# Patient Record
Sex: MALE | Race: WHITE | Employment: OTHER | URBAN - NONMETROPOLITAN AREA
[De-identification: names, ages, dates, MRNs, and addresses within clinical notes are randomized per-mention and may not be internally consistent; named-entity substitution may affect disease eponyms.]

---

## 2019-12-20 DIAGNOSIS — D75.1 POLYCYTHEMIA: Primary | ICD-10-CM

## 2019-12-23 ENCOUNTER — HOSPITAL ENCOUNTER (OUTPATIENT)
Dept: INFUSION THERAPY | Age: 59
Discharge: HOME | End: 2019-12-23
Payer: COMMERCIAL

## 2019-12-23 ENCOUNTER — OFFICE VISIT (OUTPATIENT)
Dept: HEMATOLOGY | Age: 59
End: 2019-12-23
Payer: COMMERCIAL

## 2019-12-23 VITALS
HEART RATE: 88 BPM | DIASTOLIC BLOOD PRESSURE: 82 MMHG | OXYGEN SATURATION: 96 % | SYSTOLIC BLOOD PRESSURE: 118 MMHG | WEIGHT: 170.8 LBS | HEIGHT: 70 IN | BODY MASS INDEX: 24.45 KG/M2

## 2019-12-23 DIAGNOSIS — D75.1 ERYTHROCYTOSIS: Primary | ICD-10-CM

## 2019-12-23 DIAGNOSIS — D75.1 ERYTHROCYTOSIS: ICD-10-CM

## 2019-12-23 DIAGNOSIS — R53.82 CHRONIC FATIGUE: ICD-10-CM

## 2019-12-23 DIAGNOSIS — D75.1 POLYCYTHEMIA: ICD-10-CM

## 2019-12-23 DIAGNOSIS — M25.50 MULTIPLE JOINT PAIN: ICD-10-CM

## 2019-12-23 PROCEDURE — 85025 COMPLETE CBC W/AUTO DIFF WBC: CPT

## 2019-12-23 PROCEDURE — 99213 OFFICE O/P EST LOW 20 MIN: CPT | Performed by: NURSE PRACTITIONER

## 2019-12-23 PROCEDURE — 99211 OFF/OP EST MAY X REQ PHY/QHP: CPT

## 2019-12-23 RX ORDER — OMEPRAZOLE 40 MG/1
CAPSULE, DELAYED RELEASE ORAL
COMMUNITY
Start: 2019-11-20 | End: 2021-06-21

## 2019-12-23 NOTE — PROGRESS NOTES
Phlebotomy as ordered. 400cc removed from [address] [place] [address] without complication. 400cc oral intake.

## 2019-12-27 ASSESSMENT — ENCOUNTER SYMPTOMS
VOMITING: 0
ABDOMINAL PAIN: 0
NAUSEA: 0
TROUBLE SWALLOWING: 0
PHOTOPHOBIA: 0
EYE REDNESS: 0
WHEEZING: 0
SHORTNESS OF BREATH: 0
DIARRHEA: 0
EYE ITCHING: 0
SORE THROAT: 0
COUGH: 0
CONSTIPATION: 0
EYE DISCHARGE: 0

## 2020-06-19 NOTE — PROGRESS NOTES
the erythrocytosis of unclear etiology. Mr. Lynne Gonzales seems to feel that his phlebotomies occasionally improved his symptoms of arthritis but this is yet to be determined if there is a direct correlation between these procedures and his rheumatologic symptoms. Mr. Sharie Sicard hemoglobin at his initial consultation in this office was 11.5 with an MCV of 84.2. The other parameters of his CBC were normal.  A bone marrow biopsy and aspirate was performed on 9/18/14. This was normocellular with trilineage hematopoesis and only mild megakaryocytic hyperplasia. There was NO evidence of JAK2 V617F or Exon 12 mutation. Stainable iron was absent cytogenetics were normal it was felt that his erythrocytosis was secondary. An erythropoietin level was normal at 6.5. A sleep apnea study was done with Dr. Brayden Baker on 10/21/2014 revealing mild obstructive sleep apnea and insomnia. Recommendations were to avoid alcohol prior to sleep (Mr. Lynne Gonzales does drink beer regularly, but states he is not concerned about this because it is [employer] light). Continued periodic phlebotomies will be done. CALR and MPL W515 mutations were negative. Past Medical History:   Diagnosis Date    Colon polyposis     COPD (chronic obstructive pulmonary disease) ([address]     Mild    Diverticulosis     Erythrocytosis     Former smoker     Quit 2004    GERD (gastroesophageal reflux disease)     With Schatzki's ring    Hyperlipidemia      Past Surgical History:   Procedure Laterality Date    COLONOSCOPY  04/19/2017    In [place], [place] at [facility]  03/12/2013    In [place]     Current Outpatient Medications   Medication Sig Dispense Refill    aspirin 81 MG EC tablet Take 81 mg by mouth daily      omeprazole (PRILOSEC) 40 MG delayed release capsule TAKE ONE CAPSULE BY MOUTH EVERY MORNING       No current facility-administered medications for this visit.        Allergies   Allergen Reactions    Penicillins      Social History     Tobacco Use    Smoking status: Former Smoker     Types: Cigarettes     Last attempt to quit: 2004     Years since quittin.2    Smokeless tobacco: Never Used   Substance Use Topics    Alcohol use: Not on file    Drug use: Not on file     Family History: non-contributory    Review of Systems   Constitutional: Positive for fatigue. Negative for fever. No night sweats   HENT: Negative for dental problem, hearing loss, mouth sores, nosebleeds, sore throat and trouble swallowing. Eyes: Negative for photophobia, discharge, redness and itching. No blurring of vision, no double vision   Respiratory: Negative for cough, shortness of breath and wheezing. No hemoptysis   Cardiovascular: Negative for chest pain, palpitations and leg swelling. Gastrointestinal: Negative for abdominal pain, constipation, diarrhea, nausea and vomiting. Endocrine: Negative for cold intolerance, heat intolerance, polydipsia and polyuria. Genitourinary: Negative for dysuria, frequency, hematuria and urgency. Musculoskeletal: Positive for arthralgias and neck pain. Negative for joint swelling and myalgias. Skin: Negative for pallor and rash. Allergic/Immunologic: Negative for environmental allergies and immunocompromised state. Neurological: Negative for seizures, syncope and numbness. Hematological: Negative for adenopathy. Does not bruise/bleed easily. Psychiatric/Behavioral: Negative for agitation, behavioral problems, confusion and suicidal ideas. The patient is not nervous/anxious. Physical Exam  Vitals signs reviewed. Constitutional:       General: He is not in acute distress. Appearance: He is well-developed. He is not toxic-appearing or diaphoretic. HENT:      Head: Normocephalic and atraumatic.       Right Ear: External ear normal.      Left Ear: External ear normal.      Nose: Nose normal.      Mouth/Throat:      Mouth: Mucous membranes are

## 2020-06-23 ENCOUNTER — HOSPITAL ENCOUNTER (OUTPATIENT)
Dept: INFUSION THERAPY | Age: 60
Discharge: HOME | End: 2020-06-23
Payer: COMMERCIAL

## 2020-06-23 ENCOUNTER — OFFICE VISIT (OUTPATIENT)
Dept: HEMATOLOGY | Age: 60
End: 2020-06-23
Payer: COMMERCIAL

## 2020-06-23 VITALS
OXYGEN SATURATION: 95 % | BODY MASS INDEX: 24.18 KG/M2 | SYSTOLIC BLOOD PRESSURE: 120 MMHG | WEIGHT: 168.9 LBS | HEIGHT: 70 IN | TEMPERATURE: 98.4 F | DIASTOLIC BLOOD PRESSURE: 70 MMHG | HEART RATE: 72 BPM

## 2020-06-23 DIAGNOSIS — D75.1 ERYTHROCYTOSIS: ICD-10-CM

## 2020-06-23 PROBLEM — M54.2 NECK PAIN: Status: ACTIVE | Noted: 2020-06-23

## 2020-06-23 LAB
BASOPHILS ABSOLUTE: 0.05 K/UL (ref 0.01–0.08)
BASOPHILS RELATIVE PERCENT: 0.6 % (ref 0.1–1.2)
EOSINOPHILS ABSOLUTE: 0.31 K/UL (ref 0.04–0.54)
EOSINOPHILS RELATIVE PERCENT: 3.7 % (ref 0.7–7)
HCT VFR BLD CALC: 49.3 % (ref 40.1–51)
HEMOGLOBIN: 16 G/DL (ref 13.7–17.5)
LYMPHOCYTES ABSOLUTE: 1.68 K/UL (ref 1.18–3.74)
LYMPHOCYTES RELATIVE PERCENT: 20.1 % (ref 19.3–53.1)
MCH RBC QN AUTO: 30.7 PG (ref 25.7–32.2)
MCHC RBC AUTO-ENTMCNC: 32.5 G/DL (ref 32.3–36.5)
MCV RBC AUTO: 94.4 FL (ref 79–92.2)
MONOCYTES ABSOLUTE: 0.84 K/UL (ref 0.24–0.82)
MONOCYTES RELATIVE PERCENT: 10.1 % (ref 4.7–12.5)
NEUTROPHILS ABSOLUTE: 5.47 K/UL (ref 1.56–6.13)
NEUTROPHILS RELATIVE PERCENT: 65.5 % (ref 34–71.1)
PDW BLD-RTO: 12.8 % (ref 11.6–14.4)
PLATELET # BLD: 211 K/UL (ref 163–337)
PMV BLD AUTO: 9.9 FL (ref 7.4–10.4)
RBC # BLD: 5.22 M/UL (ref 4.63–6.08)
WBC # BLD: 8.35 K/UL (ref 4.23–9.07)

## 2020-06-23 PROCEDURE — 99213 OFFICE O/P EST LOW 20 MIN: CPT | Performed by: NURSE PRACTITIONER

## 2020-06-23 PROCEDURE — 85025 COMPLETE CBC W/AUTO DIFF WBC: CPT

## 2020-06-23 PROCEDURE — 99195 PHLEBOTOMY: CPT

## 2020-06-23 RX ORDER — ASPIRIN 81 MG/1
81 TABLET ORAL DAILY
COMMUNITY

## 2020-06-23 ASSESSMENT — ENCOUNTER SYMPTOMS
CONSTIPATION: 0
NAUSEA: 0
WHEEZING: 0
EYE DISCHARGE: 0
EYE REDNESS: 0
PHOTOPHOBIA: 0
TROUBLE SWALLOWING: 0
SHORTNESS OF BREATH: 0
DIARRHEA: 0
VOMITING: 0
SORE THROAT: 0
EYE ITCHING: 0
ABDOMINAL PAIN: 0
COUGH: 0

## 2020-06-23 NOTE — PROGRESS NOTES
THERAPEUTIC PHLEBOTOMY  Most recent Hemoglobin 16.0 Gm/dl and Hematocrit 49.3%   Pre-phlebotomy Vital Signs: Refer to Doc flow sheet  Start time: 0950  Tourniquet placed on patient's arm and arm palpated for venous access. Area of venous access cleansed with alcohol. Sheath removed from the needle and needle inserted into the left antecubital site. Blood flowing well down the tubing into collection bag. Adjustment/no adjustment of needle needed to maintain adequate blood flow. Scale monitoring amount of blood in bag. Stop Time: 1010  Needle removed from patient's arm. Pressure applied to patient's arm until bleeding stopped. Dry sterile dressing applied over puncture site and secured with Coban/self-adherent wrap. Final amount of blood removed: 500 cc  Post Vital Signs: Refer to Doc flow sheet  Patient tolerated procedure well. No redness, edema, or signs of active bleeding noted. Discharge Instructions provided: No heavy pushing or lifting for the next 24 hours. Keep dressing dry and in place for 4 to 6 hours. If a fever GREATER than 100.5 develops, contact office. Patient discharged ambulatory with belongings.

## 2020-12-10 ENCOUNTER — HOSPITAL ENCOUNTER (OUTPATIENT)
Dept: INFUSION THERAPY | Age: 60
Discharge: HOME | End: 2020-12-10
Payer: COMMERCIAL

## 2020-12-10 ENCOUNTER — OFFICE VISIT (OUTPATIENT)
Dept: HEMATOLOGY | Age: 60
End: 2020-12-10
Payer: COMMERCIAL

## 2020-12-10 VITALS
TEMPERATURE: 97.8 F | DIASTOLIC BLOOD PRESSURE: 82 MMHG | HEART RATE: 73 BPM | WEIGHT: 170.1 LBS | BODY MASS INDEX: 24.35 KG/M2 | SYSTOLIC BLOOD PRESSURE: 128 MMHG | HEIGHT: 70 IN | OXYGEN SATURATION: 97 %

## 2020-12-10 DIAGNOSIS — D75.1 ERYTHROCYTOSIS: Primary | ICD-10-CM

## 2020-12-10 LAB
BASOPHILS ABSOLUTE: 0.04 K/UL (ref 0.01–0.08)
BASOPHILS RELATIVE PERCENT: 0.3 % (ref 0.1–1.2)
EOSINOPHILS ABSOLUTE: 0.06 K/UL (ref 0.04–0.54)
EOSINOPHILS RELATIVE PERCENT: 0.4 % (ref 0.7–7)
HCT VFR BLD CALC: 47 % (ref 40.1–51)
HEMOGLOBIN: 15.5 G/DL (ref 13.7–17.5)
LYMPHOCYTES ABSOLUTE: 1.75 K/UL (ref 1.18–3.74)
LYMPHOCYTES RELATIVE PERCENT: 13.1 % (ref 19.3–53.1)
MCH RBC QN AUTO: 30.9 PG (ref 25.7–32.2)
MCHC RBC AUTO-ENTMCNC: 33 G/DL (ref 32.3–36.5)
MCV RBC AUTO: 93.8 FL (ref 79–92.2)
MONOCYTES ABSOLUTE: 0.97 K/UL (ref 0.24–0.82)
MONOCYTES RELATIVE PERCENT: 7.3 % (ref 4.7–12.5)
NEUTROPHILS ABSOLUTE: 10.52 K/UL (ref 1.56–6.13)
NEUTROPHILS RELATIVE PERCENT: 78.9 % (ref 34–71.1)
PDW BLD-RTO: 12.8 % (ref 11.6–14.4)
PLATELET # BLD: 249 K/UL (ref 163–337)
PMV BLD AUTO: 9.9 FL (ref 7.4–10.4)
RBC # BLD: 5.01 M/UL (ref 4.63–6.08)
WBC # BLD: 13.34 K/UL (ref 4.23–9.07)

## 2020-12-10 PROCEDURE — 99195 PHLEBOTOMY: CPT

## 2020-12-10 PROCEDURE — 99213 OFFICE O/P EST LOW 20 MIN: CPT | Performed by: NURSE PRACTITIONER

## 2020-12-10 PROCEDURE — 85025 COMPLETE CBC W/AUTO DIFF WBC: CPT

## 2020-12-10 RX ORDER — 0.9 % SODIUM CHLORIDE 0.9 %
250 INTRAVENOUS SOLUTION INTRAVENOUS ONCE
Status: CANCELLED | OUTPATIENT
Start: 2020-12-10

## 2020-12-10 RX ORDER — 0.9 % SODIUM CHLORIDE 0.9 %
500 INTRAVENOUS SOLUTION INTRAVENOUS ONCE
Status: CANCELLED | OUTPATIENT
Start: 2020-12-10

## 2020-12-10 ASSESSMENT — ENCOUNTER SYMPTOMS
VOMITING: 0
EYE ITCHING: 0
TROUBLE SWALLOWING: 0
NAUSEA: 0
EYE DISCHARGE: 0
SHORTNESS OF BREATH: 0
COUGH: 0
SORE THROAT: 0
CONSTIPATION: 0
WHEEZING: 0
DIARRHEA: 0
ABDOMINAL PAIN: 0

## 2020-12-10 NOTE — PROGRESS NOTES
Progress Note      Pt Name: Vincenzo Zambraon  YOB: 1960  MRN: [id number]    Date of evaluation: 12/10/2020  History Obtained From:  Patient, EMR    HISTORY OF PRESENT ILLNESS:    Sydni Shearer is a 64year-old  gentleman who receives phlebotomies every 6 months for symptom management regarding a diagnosis of secondary erythrocytosis. Symptoms include fatigue, headaches, joint pain that are relieved with phlebotomy. GERD is stable. He has noticed mild change in vision over the past 2-3 weeks. CMP was unremarkable and ferritin was 34 on 12/23/2019. H/H is 15.5/47.0 and blood pressure 128/82. Damaris Johnson returns to the clinic, scheduled for phlebotomy today. He denies changes in medications or health history since last evaluated 6 months ago. Health Maintenance:  Colonoscopy 4/19/2017 revealed that he did have a transverse colon polyp removed, he has hemorrhoids and diverticulosis. PSA per PCP     HEMATOLOGIC HISTORY: Secondary erythrocytosis  Mr. Gavi Quiroz was seen in initial hematologic evaluation in this office on 09/03/13, referred by Dr. Gissel Gonsalves for polycythemia. Mr. Gavi Quiroz was first diagnosed with polycythemia in November 2011 when he presented with a CBC on 10/15/13 with a hemoglobin of 18.9 and hematocrit of 56.8. Platelets and WBC were normal. His workup was performed by Dr. Christiane Leos with a negative JAK2 mutation analysis and also negative for BCR/abl. Mr. Gavi Quiroz does have a 25 pack/year history of smoking however he quit in 2004. Management with periodic phlebotomies has been performed per Dr. Gopi Martinez in conjunction with Dr. Manohar Edge for the erythrocytosis of unclear etiology. Mr. Gavi Quiroz seems to feel that his phlebotomies occasionally improved his symptoms of arthritis but this is yet to be determined if there is a direct correlation between these procedures and his rheumatologic symptoms.   Mr. Sánchez Mantle hemoglobin at his initial consultation in this office Normal breath sounds. No wheezing or rales. Abdominal:      General: Bowel sounds are normal. There is no distension. Palpations: Abdomen is soft. Tenderness: There is no abdominal tenderness. There is no guarding. Genitourinary:     Comments: Exam deferred  Musculoskeletal:         General: No tenderness or deformity. Comments: Normal ROM all four extremities   Lymphadenopathy:      Cervical: No cervical adenopathy. Right cervical: No superficial cervical adenopathy. Left cervical: No superficial cervical adenopathy. Upper Body:      Right upper body: No supraclavicular adenopathy. Left upper body: No supraclavicular adenopathy. Comments: No bulky palpable cervical, clavicular, axillary or inguinal adenopathies on the left or right. Skin:     General: Skin is warm and dry. Findings: No rash. Neurological:      Mental Status: He is alert and oriented to person, place, and time. Comments: follows commands, non-focal   Psychiatric:         Behavior: Behavior normal. Behavior is cooperative. Thought Content: Thought content normal.         Judgment: Judgment normal.      Comments: Alert and oriented to person, place and time. Vitals:    12/10/20 1034   BP: 128/82   Pulse: 73   Temp: 97.8 °F (36.6 °C)   TempSrc: Temporal   SpO2: 97%   Weight: 170 lb 1.6 oz (77.2 kg)   Height: 5' 10\" (1.778 m)      Wt Readings from Last 3 Encounters:   12/10/20 170 lb 1.6 oz (77.2 kg)   06/23/20 168 lb 14.4 oz (76.6 kg)   12/23/19 170 lb 12.8 oz (77.5 kg)     LABS:  CBC 12/10/2020: WBC 13.34, Hgb 15.5/HCT 47, platelets 372,973    ASSESSMENT/PLAN:    No diagnosis found. Secondary erythrocytosis  H/H 15.5/47  Therapeutic phlebotomy will be performed today and continue every 6 months as needed for symptomatic management    Chronic fatigue, multiple joint pain  Symptoms improved with phlebotomy     GERD  Stable, on Omeprazole.      Body mass index is 24.41 kg/m². BMI is within normal limits. Federal guidelines recommend that people under the age of 72 should have a BMI of 18.5-25 and people age 72 and older should have a BMI of 23-30. If yours is outside the range, we recommend you utilize a diet and exercise program to get yours into the range. We also recommend you speak with your primary care doctor should any specific advice be needed regarding special diets or programs which would be appropriate for your circumstances. Return in about 6 months (around 6/10/2021) for follow up with JAMES Hurst with phlebotomy. I have seen, examined and reviewed patient medication list, appropriate labs and imaging studies. Relevant medical records and other physician notes have been reviewed. I answered all questions to the best of my knowledge and to the patient's satisfaction. Dictated utilizing Dragon transcription software.          JAMES Cid  8:04 PM  12/10/2020

## 2020-12-10 NOTE — PROGRESS NOTES
THERAPEUTIC PHLEBOTOMY  Most recent Hemoglobin 15.5 Gm/dl and Hematocrit 47%   Date obtained: 12 /10 /2020    Pre-phlebotomy Vital Signs: Refer to Doc flow sheet  Start time: 1115  Tourniquet placed on patient's arm and arm palpated for venous access. Area of venous access cleansed with alcohol. Sheath removed from the needle and needle inserted into the left antecubital site. Blood flowing well down the tubing into collection bag. Adjustment/no adjustment of needle needed to maintain adequate blood flow. Scale monitoring amount of blood in bag. Stop Time: 1130  Needle removed from patient's arm. Pressure applied to patient's arm until bleeding stopped. Dry sterile dressing applied over puncture site and secured with Coban/self-adherent wrap. Final amount of blood removed: 500cc  Post Vital Signs: Refer to Doc flow sheet    Patient tolerated procedure well. No redness, edema, or signs of active bleeding noted. Discharge Instructions provided: No heavy pushing or lifting for the next 24 hours. Keep dressing dry and in place for 4 to 6 hours. If a fever GREATER than 100.5 develops, contact office. Patient discharged ambulatory with belongings.

## 2021-06-18 NOTE — PROGRESS NOTES
Progress Note      Pt Name: Carlos Junior  YOB: 1960  MRN: [id number]    Date of evaluation: 6/21/2021  History Obtained From:  Patient, EMR    HISTORY OF PRESENT ILLNESS:    Cindy Reid is a 64year-old  gentleman returning to the clinic for evaluation and phlebotomy as needed for secondary erythrocytosis (JAK2 negative). Tessa Linton who receives phlebotomies every 6 months for symptom control of fatigue, headaches, joint pain that he feels is improved with phlebotomy. He states he is feeling better today than he has in a while. He does not feel he needs a phlebotomy. He did receive short course prednisone for generalized aches 12/20/2020 and 5/20/2020, per PCP. H/H is improved, 14.9/44.9. Rakan discontinued omeprazole 3-4 months ago, stating it made him feel tired. He initiated lansoprazole (Prevacid), GERD has been fairly stable. Blood pressure is stable, 124/68. Primary care needs are managed by Dr. Sonido Steen. Health Maintenance:  Colonoscopy 4/19/2017 revealed that he did have a transverse colon polyp removed, he has hemorrhoids and diverticulosis. PSA per PCP     HEMATOLOGIC HISTORY: Secondary erythrocytosis  Mr. Collins was seen in initial hematologic evaluation in this office on 09/03/13, referred by Dr. Sonido Steen for polycythemia. Mr. Collins was first diagnosed with polycythemia in November 2011 when he presented with a CBC on 10/15/13 with a hemoglobin of 18.9 and hematocrit of 56.8. Platelets and WBC were normal. His workup was performed by Dr. Vale Cotton with a negative JAK2 mutation analysis and also negative for BCR/abl. Mr. Collins does have a 25 pack/year history of smoking however he quit in 2004. Management with periodic phlebotomies has been performed per Dr. Micha Anderson in conjunction with Dr. Jay Sahni for the erythrocytosis of unclear etiology.  Mr. Collins seems to feel that his phlebotomies occasionally improved his symptoms of arthritis but utilizing Dragon transcription software.       JAMES Goodwin  7:01 PM  6/23/2021

## 2021-06-21 ENCOUNTER — HOSPITAL ENCOUNTER (OUTPATIENT)
Dept: INFUSION THERAPY | Age: 61
Discharge: HOME | End: 2021-06-21
Payer: COMMERCIAL

## 2021-06-21 ENCOUNTER — OFFICE VISIT (OUTPATIENT)
Dept: HEMATOLOGY | Age: 61
End: 2021-06-21
Payer: COMMERCIAL

## 2021-06-21 VITALS
SYSTOLIC BLOOD PRESSURE: 124 MMHG | HEART RATE: 66 BPM | BODY MASS INDEX: 24.24 KG/M2 | OXYGEN SATURATION: 96 % | HEIGHT: 70 IN | WEIGHT: 169.3 LBS | DIASTOLIC BLOOD PRESSURE: 68 MMHG

## 2021-06-21 DIAGNOSIS — M25.50 MULTIPLE JOINT PAIN: ICD-10-CM

## 2021-06-21 DIAGNOSIS — D75.1 ERYTHROCYTOSIS: ICD-10-CM

## 2021-06-21 DIAGNOSIS — K21.9 GASTROESOPHAGEAL REFLUX DISEASE WITHOUT ESOPHAGITIS: ICD-10-CM

## 2021-06-21 DIAGNOSIS — D75.1 ERYTHROCYTOSIS: Primary | ICD-10-CM

## 2021-06-21 DIAGNOSIS — R53.82 CHRONIC FATIGUE: ICD-10-CM

## 2021-06-21 LAB
BASOPHILS ABSOLUTE: 0.04 K/UL (ref 0.01–0.08)
BASOPHILS RELATIVE PERCENT: 0.7 % (ref 0.1–1.2)
EOSINOPHILS ABSOLUTE: 0.35 K/UL (ref 0.04–0.54)
EOSINOPHILS RELATIVE PERCENT: 5.8 % (ref 0.7–7)
HCT VFR BLD CALC: 44.9 % (ref 40.1–51)
HEMOGLOBIN: 14.9 G/DL (ref 13.7–17.5)
LYMPHOCYTES ABSOLUTE: 1.31 K/UL (ref 1.18–3.74)
LYMPHOCYTES RELATIVE PERCENT: 21.6 % (ref 19.3–53.1)
MCH RBC QN AUTO: 30.3 PG (ref 25.7–32.2)
MCHC RBC AUTO-ENTMCNC: 33.2 G/DL (ref 32.3–36.5)
MCV RBC AUTO: 91.4 FL (ref 79–92.2)
MONOCYTES ABSOLUTE: 0.64 K/UL (ref 0.24–0.82)
MONOCYTES RELATIVE PERCENT: 10.6 % (ref 4.7–12.5)
NEUTROPHILS ABSOLUTE: 3.72 K/UL (ref 1.56–6.13)
NEUTROPHILS RELATIVE PERCENT: 61.3 % (ref 34–71.1)
PDW BLD-RTO: 13.1 % (ref 11.6–14.4)
PLATELET # BLD: 160 K/UL (ref 163–337)
PMV BLD AUTO: 11 FL (ref 7.4–10.4)
RBC # BLD: 4.91 M/UL (ref 4.63–6.08)
WBC # BLD: 6.06 K/UL (ref 4.23–9.07)

## 2021-06-21 PROCEDURE — 99213 OFFICE O/P EST LOW 20 MIN: CPT | Performed by: NURSE PRACTITIONER

## 2021-06-21 PROCEDURE — 99211 OFF/OP EST MAY X REQ PHY/QHP: CPT

## 2021-06-21 PROCEDURE — 85025 COMPLETE CBC W/AUTO DIFF WBC: CPT

## 2021-06-21 RX ORDER — PREDNISONE 1 MG/1
TABLET ORAL
COMMUNITY
Start: 2021-05-27 | End: 2021-06-21

## 2021-06-21 RX ORDER — LANSOPRAZOLE 30 MG/1
CAPSULE, DELAYED RELEASE ORAL
COMMUNITY
Start: 2021-04-02

## 2021-06-23 ASSESSMENT — ENCOUNTER SYMPTOMS
DIARRHEA: 0
TROUBLE SWALLOWING: 0
WHEEZING: 0
ABDOMINAL PAIN: 0
EYE ITCHING: 0
COUGH: 0
SORE THROAT: 0
SHORTNESS OF BREATH: 0
CONSTIPATION: 0
NAUSEA: 0
VOMITING: 0
EYE DISCHARGE: 0

## 2021-12-09 NOTE — PROGRESS NOTES
Progress Note      Pt Name: Marcel Roy  YOB: 1960  MRN: [id number]    Date of evaluation: 12/13/2021  History Obtained From:  Patient, EMR    HISTORY OF PRESENT ILLNESS:    Reyna Sanchez is a 63year-old  gentleman monitored conservatively regarding a history of secondary erythrocytosis (JAK2 negative). H/H has remained stable, though every receives phlebotomies every 6 months for symptom control of headache, fatigue, joint pain and itching. Symptoms do improve with phlebotomy. At his last evaluation 6 months ago, Rakan's symptoms were stable. They have since slowly returned and he desires phlebotomy today. H/H is 16.0/48. 7. Blood pressure stable. GERD is stable on lansoprazole. Raul Peacock continues aspirin 81 mg daily. He is having itching, especially to his lower extremities. He denies new complaint. Primary care needs are managed by Dr. Oliva Garcia. Health Maintenance:  Colonoscopy 4/19/2017 revealed that he did have a transverse colon polyp removed, he has hemorrhoids and diverticulosis. PSA per PCP     HEMATOLOGIC HISTORY: Secondary erythrocytosis  Mr. Collins was seen in initial hematologic evaluation in this office on 09/03/13, referred by Dr. Oliva Garcia for polycythemia. Mr. Collins was first diagnosed with polycythemia in November 2011 when he presented with a CBC on 10/15/13 with a hemoglobin of 18.9 and hematocrit of 56.8. Platelets and WBC were normal. His workup was performed by Dr. Marie Gamez with a negative JAK2 mutation analysis and also negative for BCR/abl. Mr. Collins does have a 25 pack/year history of smoking however he quit in 2004. Management with periodic phlebotomies has been performed per Dr. Ene Davalos in conjunction with Dr. Jovanni Servin for the erythrocytosis of unclear etiology.  Mr. Collins seems to feel that his phlebotomies occasionally improved his symptoms of arthritis but this is yet to be determined if there is a direct correlation between these procedures and his rheumatologic symptoms. Mr. Stanislav Kumar hemoglobin at his initial consultation in this office was 11.5 with an MCV of 84.2. The other parameters of his CBC were normal.  A bone marrow biopsy and aspirate was performed on 14. This was normocellular with trilineage hematopoesis and only mild megakaryocytic hyperplasia. There was NO evidence of JAK2 V617F or Exon 12 mutation. Stainable iron was absent cytogenetics were normal it was felt that his erythrocytosis was secondary. An erythropoietin level was normal at 6.5. A sleep apnea study was done with Dr. Susana Quintero on 10/21/2014 revealing mild obstructive sleep apnea and insomnia. Recommendations were to avoid alcohol prior to sleep (Mr. Collins does drink beer regularly, but states he is not concerned about this because it is [employer] light). Continued periodic phlebotomies will be done. CALR and MPL W515 mutations were negative. Past Medical History:   Diagnosis Date    Colon polyposis     COPD (chronic obstructive pulmonary disease) ([facility] [address]     Mild    Diverticulosis     Erythrocytosis     Former smoker     Quit     GERD (gastroesophageal reflux disease)     With Schatzki's ring    Hyperlipidemia      Past Surgical History:   Procedure Laterality Date    COLONOSCOPY  2017    In [address] at [address] [facility]  2013    In [address]     Current Outpatient Medications   Medication Sig Dispense Refill    lansoprazole (PREVACID) 30 MG delayed release capsule TAKE ONE CAPSULE BY MOUTH EVERY MORNING      aspirin 81 MG EC tablet Take 81 mg by mouth daily       No current facility-administered medications for this visit.       Allergies   Allergen Reactions    Penicillins Rash     Social History     Tobacco Use    Smoking status: Former Smoker     Types: Cigarettes     Quit date: 2004     Years since quittin.7    Smokeless tobacco: Never Used   Vaping Use    Vaping Use: Never used   Substance Use Topics    Alcohol use: Not on file     Comment: on occasion    Drug use: Never     Family History: non-contributory    Review of Systems   Constitutional: Positive for fatigue. Negative for fever. No night sweats   HENT: Negative for dental problem, hearing loss, mouth sores, nosebleeds, sore throat and trouble swallowing. Eyes: Negative for discharge and itching. Respiratory: Negative for cough, shortness of breath and wheezing. No hemoptysis   Cardiovascular: Negative for chest pain, palpitations and leg swelling. Gastrointestinal: Negative for abdominal pain, constipation, diarrhea, nausea and vomiting. GERD - stable   Endocrine: Negative for cold intolerance and heat intolerance. Genitourinary: Negative for dysuria, frequency, hematuria and urgency. Musculoskeletal: Positive for arthralgias. Negative for joint swelling and myalgias. Skin: Negative for pallor and rash. Pruritis    Allergic/Immunologic: Negative for environmental allergies and immunocompromised state. Neurological: Negative for seizures, syncope and numbness. Hematological: Negative for adenopathy. Does not bruise/bleed easily. Psychiatric/Behavioral: Negative for agitation, behavioral problems and confusion. Physical Exam  Vitals reviewed. Constitutional:       General: He is not in acute distress. Appearance: He is well-developed. He is not toxic-appearing or diaphoretic. HENT:      Head: Normocephalic and atraumatic. Right Ear: External ear normal.      Left Ear: External ear normal.      Nose: Nose normal.      Mouth/Throat:      Mouth: Mucous membranes are moist.   Eyes:      General: No scleral icterus. Right eye: No discharge. Left eye: No discharge. Conjunctiva/sclera: Conjunctivae normal.   Neck:      Trachea: No tracheal deviation. Cardiovascular:      Rate and Rhythm: Normal rate and regular rhythm. Pulmonary:      Effort: Pulmonary effort is normal. No respiratory distress. Breath sounds: Normal breath sounds. No wheezing or rales. Chest:   Breasts:      Right: No supraclavicular adenopathy. Left: No supraclavicular adenopathy. Abdominal:      General: Bowel sounds are normal. There is no distension. Palpations: Abdomen is soft. Tenderness: There is no abdominal tenderness. There is no guarding. Genitourinary:     Comments: Exam deferred  Musculoskeletal:         General: No tenderness or deformity. Cervical back: Neck supple. Comments: Normal ROM all four extremities   Lymphadenopathy:      Cervical: No cervical adenopathy. Right cervical: No superficial cervical adenopathy. Left cervical: No superficial cervical adenopathy. Upper Body:      Right upper body: No supraclavicular adenopathy. Left upper body: No supraclavicular adenopathy. Comments:      Skin:     General: Skin is warm and dry. Findings: No rash. Neurological:      Mental Status: He is alert and oriented to person, place, and time. Comments: follows commands, non-focal   Psychiatric:         Behavior: Behavior normal. Behavior is cooperative. Thought Content: Thought content normal.         Judgment: Judgment normal.      Comments: Alert and oriented to person, place and time. Vitals:    12/13/21 0932   BP: (!) 140/75   Pulse: 69   SpO2: 98%   Weight: 169 lb 12.8 oz (77 kg)   Height: 5' 10\" (1.778 m)      Wt Readings from Last 3 Encounters:   12/13/21 169 lb 12.8 oz (77 kg)   06/21/21 169 lb 4.8 oz (76.8 kg)   12/10/20 170 lb 1.6 oz (77.2 kg)     LABS:  CBC 12/13/2021: WBC 6.54, Hgb/HCT 16.0/48.7 with MCV 94.4. Platelets 563,048    ASSESSMENT/PLAN:    1. Secondary erythrocytosis    2. Chronic fatigue    3. Multiple joint pain    4. Itching       Secondary erythrocytosis  H/H 16.0/48. 7  phlebotomy today, per patient request for symptom control  He will be monitored conservatively with phlebotomy every 6 months PRN for symptom control    Chronic fatigue, multiple joint pain, itching  Monitor symptoms with phlebotomy    Health Maintenance  PSA monitored by Dr. Diamond Vasquez, according to patient  Colonoscopy 4/19/2017 in [place], [place]. Patient plans to schedule in 2022. Return in about 6 months (around 6/13/2022) for follow up with JAMES Johns for phlebotomy. I have seen, examined and reviewed this patient medication list, appropriate labs and imaging studies. I reviewed relevant medical records and others physicians notes. I discussed the plans of care with the patient. I answered all the questions to the patients satisfaction. I have also reviewed the chief complaint (CC) and part of the history (History of Present Illness (HPI), Past Family Social History [facility]), or Review of Systems (ROS) and made changes when appropriated. Dictated utilizing Dragon transcription software.       JAMES Bhardwaj  9:55 AM  12/13/2021

## 2021-12-10 DIAGNOSIS — D75.1 ERYTHROCYTOSIS: Primary | ICD-10-CM

## 2021-12-13 ENCOUNTER — OFFICE VISIT (OUTPATIENT)
Dept: HEMATOLOGY | Age: 61
End: 2021-12-13
Payer: COMMERCIAL

## 2021-12-13 ENCOUNTER — HOSPITAL ENCOUNTER (OUTPATIENT)
Dept: INFUSION THERAPY | Age: 61
Discharge: HOME | End: 2021-12-13
Payer: COMMERCIAL

## 2021-12-13 VITALS
SYSTOLIC BLOOD PRESSURE: 140 MMHG | HEIGHT: 70 IN | HEART RATE: 69 BPM | OXYGEN SATURATION: 98 % | WEIGHT: 169.8 LBS | BODY MASS INDEX: 24.31 KG/M2 | DIASTOLIC BLOOD PRESSURE: 75 MMHG

## 2021-12-13 DIAGNOSIS — M25.50 MULTIPLE JOINT PAIN: ICD-10-CM

## 2021-12-13 DIAGNOSIS — D75.1 SECONDARY ERYTHROCYTOSIS: Primary | ICD-10-CM

## 2021-12-13 DIAGNOSIS — R53.82 CHRONIC FATIGUE: ICD-10-CM

## 2021-12-13 DIAGNOSIS — D75.1 ERYTHROCYTOSIS: ICD-10-CM

## 2021-12-13 DIAGNOSIS — L29.9 ITCHING: ICD-10-CM

## 2021-12-13 LAB
BASOPHILS ABSOLUTE: 0.03 K/UL (ref 0.01–0.08)
BASOPHILS RELATIVE PERCENT: 0.5 % (ref 0.1–1.2)
EOSINOPHILS ABSOLUTE: 0.42 K/UL (ref 0.04–0.54)
EOSINOPHILS RELATIVE PERCENT: 6.4 % (ref 0.7–7)
HCT VFR BLD CALC: 48.7 % (ref 40.1–51)
HEMOGLOBIN: 16 G/DL (ref 13.7–17.5)
LYMPHOCYTES ABSOLUTE: 1.37 K/UL (ref 1.18–3.74)
LYMPHOCYTES RELATIVE PERCENT: 20.9 % (ref 19.3–53.1)
MCH RBC QN AUTO: 31 PG (ref 25.7–32.2)
MCHC RBC AUTO-ENTMCNC: 32.9 G/DL (ref 32.3–36.5)
MCV RBC AUTO: 94.4 FL (ref 79–92.2)
MONOCYTES ABSOLUTE: 0.62 K/UL (ref 0.24–0.82)
MONOCYTES RELATIVE PERCENT: 9.5 % (ref 4.7–12.5)
NEUTROPHILS ABSOLUTE: 4.1 K/UL (ref 1.56–6.13)
NEUTROPHILS RELATIVE PERCENT: 62.7 % (ref 34–71.1)
PDW BLD-RTO: 12.5 % (ref 11.6–14.4)
PLATELET # BLD: 183 K/UL (ref 163–337)
PMV BLD AUTO: 10 FL (ref 7.4–10.4)
RBC # BLD: 5.16 M/UL (ref 4.63–6.08)
WBC # BLD: 6.54 K/UL (ref 4.23–9.07)

## 2021-12-13 PROCEDURE — 99211 OFF/OP EST MAY X REQ PHY/QHP: CPT

## 2021-12-13 PROCEDURE — 85025 COMPLETE CBC W/AUTO DIFF WBC: CPT

## 2021-12-13 PROCEDURE — 99213 OFFICE O/P EST LOW 20 MIN: CPT | Performed by: NURSE PRACTITIONER

## 2021-12-13 PROCEDURE — 36415 COLL VENOUS BLD VENIPUNCTURE: CPT

## 2021-12-13 PROCEDURE — 99195 PHLEBOTOMY: CPT

## 2021-12-13 ASSESSMENT — ENCOUNTER SYMPTOMS
EYE DISCHARGE: 0
COUGH: 0
ROS SKIN COMMENTS: PRURITIS
NAUSEA: 0
DIARRHEA: 0
WHEEZING: 0
VOMITING: 0
ABDOMINAL PAIN: 0
TROUBLE SWALLOWING: 0
SORE THROAT: 0
CONSTIPATION: 0
EYE ITCHING: 0
SHORTNESS OF BREATH: 0

## 2021-12-13 NOTE — PROGRESS NOTES
Spoke with patient and conveyed results and recommendations below. Explained the importance of following these recommendations and the risks associated if not followed. Patient verbalized understanding. Scripts sent in, order placed.      Please sign diabetic educator referral THERAPEUTIC PHLEBOTOMY    Most recent Hemoglobin 16.0 Gm/dl and Hematocrit 48.7%    Date obtained: 12 /13 /2021      Pre-phlebotomy Vital Signs: Refer to Doc flow sheet    Start time: 9:45 am     Tourniquet placed on patient's arm and arm palpated for venous access. Area of venous access cleansed with alcohol. Sheath removed from the needle and needle inserted into the left antecubital site. Blood flowing well down the tubing into collection bag. Adjustment/no adjustment of needle needed to maintain adequate blood flow. Scale monitoring amount of blood in bag. Stop Time: 10:00 am   Needle removed from patient's arm. Pressure applied to patient's arm until bleeding stopped. Dry sterile dressing applied over puncture site and secured with Coban/self-adherent wrap. Final amount of blood removed: 500cc  Post Vital Signs: Refer to Doc flow sheet      Patient tolerated procedure well. No redness, edema, or signs of active bleeding noted. Discharge Instructions provided: No heavy pushing or lifting for the next 24 hours. Keep dressing dry and in place for 4 to 6 hours. If a fever GREATER than 100.5 develops, contact office. Patient discharged ambulatory with belongings.

## 2022-06-01 ENCOUNTER — OFFICE VISIT (OUTPATIENT)
Dept: CARDIOLOGY | Facility: CLINIC | Age: 62
End: 2022-06-01

## 2022-06-01 VITALS
DIASTOLIC BLOOD PRESSURE: 80 MMHG | BODY MASS INDEX: 24.44 KG/M2 | HEIGHT: 69 IN | WEIGHT: 165 LBS | SYSTOLIC BLOOD PRESSURE: 140 MMHG

## 2022-06-01 DIAGNOSIS — Z87.891 EX-SMOKER FOR MORE THAN 1 YEAR: ICD-10-CM

## 2022-06-01 DIAGNOSIS — I49.3 PVC (PREMATURE VENTRICULAR CONTRACTION): ICD-10-CM

## 2022-06-01 DIAGNOSIS — Z82.49 FAMILY HISTORY OF EARLY CAD: ICD-10-CM

## 2022-06-01 DIAGNOSIS — R06.09 DOE (DYSPNEA ON EXERTION): ICD-10-CM

## 2022-06-01 DIAGNOSIS — R00.2 PALPITATIONS: ICD-10-CM

## 2022-06-01 DIAGNOSIS — R07.9 CHEST PAIN IN ADULT: Primary | ICD-10-CM

## 2022-06-01 PROCEDURE — 99204 OFFICE O/P NEW MOD 45 MIN: CPT | Performed by: INTERNAL MEDICINE

## 2022-06-01 PROCEDURE — 93000 ELECTROCARDIOGRAM COMPLETE: CPT | Performed by: INTERNAL MEDICINE

## 2022-06-01 RX ORDER — PANTOPRAZOLE SODIUM 40 MG/1
40 TABLET, DELAYED RELEASE ORAL EVERY MORNING
COMMUNITY
Start: 2022-05-17

## 2022-06-01 RX ORDER — ASPIRIN 81 MG/1
81 TABLET ORAL
COMMUNITY

## 2022-06-01 NOTE — PROGRESS NOTES
Tyler Chin  [id number]  1960  61 y.o.  male    Referring Provider: Robbin De Jesus Jr., MD    Reason for  Visit:  Initial visit for chest pain, palpitations  and shortness of breath     Subjective     Chest pain both with exertion    Moderate substernal tightness,   Pressure like   Chest pain non pleuritic  Chest pain non positional and non gustatory   Lasts less than 5 minutes    Started more than 2 years ago  Occurs once or twice a week on the average but can be variable in frequency  No associated diaphoresis    No associated nausea  No radiation    Relieved with rest   Not positional    No change with intake of food or antacids  No change with breathing  Mild to moderate associated dyspnea    No similar chest pain episodes in the past     Intermittent palpitations, once every several days to several weeks lasting for less than 1 minute  No associated symptoms of dizziness, weakness, chest pain,  shortness of breath      Chronic low back pain    Joint pain in small, medium and large joints     History of present illness:  Tyler Chin is a 61 y.o. yo male with smoking  who presents today for   Chief Complaint   Patient presents with   • Chest Pain   • NEW PATIENT    • Fatigue     DOING ANYTHING PHYSICAL ALL THE TIME AND FEELS HEAVY   • Shortness of Breath   .    History  Past Medical History:   Diagnosis Date   • Polycythemia    ,   Past Surgical History:   Procedure Laterality Date   • TONSILLECTOMY     ,   Family History   Problem Relation Age of Onset   • Heart disease Father    ,   Social History     Tobacco Use   • Smoking status: Never Smoker   • Smokeless tobacco: Never Used   Substance Use Topics   • Alcohol use: Never   • Drug use: Never   ,     Medications  Current Outpatient Medications   Medication Sig Dispense Refill   • aspirin 81 MG EC tablet Take 81 mg by mouth.     • pantoprazole (PROTONIX) 40 MG EC tablet Take 40 mg by mouth Every Morning.       No current facility-administered  "medications for this visit.       Allergies:  Penicillins    Review of Systems  Review of Systems   Constitutional: Negative.   HENT: Negative.    Eyes: Negative.    Cardiovascular: Positive for chest pain, dyspnea on exertion and palpitations. Negative for claudication, cyanosis, irregular heartbeat, leg swelling, near-syncope, orthopnea, paroxysmal nocturnal dyspnea and syncope.   Respiratory: Negative.    Endocrine: Negative.    Hematologic/Lymphatic: Negative.    Skin: Negative.    Musculoskeletal: Positive for arthritis, back pain and joint pain.   Gastrointestinal: Negative for anorexia.   Genitourinary: Negative.    Neurological: Negative.    Psychiatric/Behavioral: Negative.        Objective     Physical Exam:  /80   Ht 175.3 cm (69\")   Wt 74.8 kg (165 lb)   BMI 24.37 kg/m²     Physical Exam  Constitutional:       Appearance: He is well-developed.   HENT:      Head: Normocephalic.   Neck:      Vascular: Normal carotid pulses. No carotid bruit or JVD.      Trachea: No tracheal tenderness or tracheal deviation.   Cardiovascular:      Rate and Rhythm: Regular rhythm.      Pulses: Normal pulses.      Heart sounds: Normal heart sounds.   Pulmonary:      Effort: Pulmonary effort is normal.      Breath sounds: No stridor.   Abdominal:      General: There is no distension.      Palpations: Abdomen is soft.      Tenderness: There is no abdominal tenderness.   Musculoskeletal:      Cervical back: No edema.   Skin:     General: Skin is warm.   Neurological:      Mental Status: He is alert.      Cranial Nerves: No cranial nerve deficit.      Sensory: No sensory deficit.   Psychiatric:         Speech: Speech normal.         Behavior: Behavior normal.         Results Review:     ____________________________________________________________________________________________________________________________________________  Health maintenance and recommendations    Low salt/ HTN/ Heart healthy carbohydrate restricted " cardiac diet   The patient is advised to reduce or avoid caffeine or other cardiac stimulants.   Minimize or avoid  NSAID-type medications      Monitor for any signs of bleeding including red or dark stools. Fall precautions.   Advised staying uptodate with immunizations per established standard guidelines.    Offered to give patient  a copy of my notes     Questions were encouraged, asked and answered to the patient's  understanding and satisfaction. Questions if any regarding current medications and side effects, need for refills and importance of compliance to medications stressed.    Reviewed available prior notes, consults, prior visits, laboratory findings, radiology and cardiology relevant reports. Updated chart as applicable. I have reviewed the patient's medical history in detail and updated the computerized patient record as relevant.      Updated patient regarding any new or relevant abnormalities on review of records or any new findings on physical exam. Mentioned to patient about purpose of visit and desirable health short and long term goals and objectives.    Primary to monitor CBC CMP Lipid panel and TSH as applicable    ___________________________________________________________________________________________________________________________________________     ECG 12 Lead    Date/Time: 6/1/2022 8:21 AM  Performed by: Terrance Bruce MD  Authorized by: Terrance Bruce MD   Comparison: not compared with previous ECG   Rhythm: sinus rhythm  Ectopy: unifocal PVCs  Rate: normal  ST Segments: ST segments normal  T Waves: T waves normal  Other: no other findings    Clinical impression: abnormal EKG            Assessment & Plan   Diagnoses and all orders for this visit:    1. Chest pain in adult (Primary)  -     Stress Test With Myocardial Perfusion (1 Day); Future    2. COWAN (dyspnea on exertion)  -     Adult Transthoracic Echo Complete w/ Color, Spectral and Contrast if necessary per protocol; Future    3.  Ex-smoker for more than 1 year    4. Family history of early CAD    5. Palpitations  -     Holter Monitor - 72 Hour Up To 15 Days; Future    6. PVC (premature ventricular contraction)    Other orders  -     ECG 12 Lead          Plan      Orders Placed This Encounter   Procedures   • Stress Test With Myocardial Perfusion (1 Day)     Standing Status:   Future     Standing Expiration Date:   6/1/2023     Order Specific Question:   What stress agent will be used?     Answer:   Regadenoson (Lexiscan)     Order Specific Question:   Difficulty walking criteria?     Answer:   Musculoskeletal (hips, knees, feet, back, amputee)     Order Specific Question:   Reason for exam?     Answer:   Chest Pain     Order Specific Question:   Release to patient     Answer:   Immediate   • Holter Monitor - 72 Hour Up To 15 Days     Standing Status:   Future     Standing Expiration Date:   6/1/2023     Order Specific Question:   Reason for exam?     Answer:   Palpitations     Order Specific Question:   Release to patient     Answer:   Immediate     Order Specific Question:   How many days is the patient to wear the monitor?     Answer:   14   • ECG 12 Lead     This order was created via procedure documentation     Order Specific Question:   Release to patient     Answer:   Immediate   • Adult Transthoracic Echo Complete w/ Color, Spectral and Contrast if necessary per protocol     Standing Status:   Future     Standing Expiration Date:   6/1/2023     Scheduling Instructions:      Myocardial strain to be performed during echocardiogram as long as technically feasible      [facility] per patient request     Order Specific Question:   Reason for exam?     Answer:   Dyspnea     Order Specific Question:   Release to patient     Answer:   Immediate        Return in about 3 weeks (around 6/22/2022).

## 2022-07-06 ENCOUNTER — HOSPITAL ENCOUNTER (OUTPATIENT)
Dept: CARDIOLOGY | Facility: HOSPITAL | Age: 62
Discharge: HOME | End: 2022-07-06

## 2022-07-06 VITALS — DIASTOLIC BLOOD PRESSURE: 81 MMHG | HEART RATE: 55 BPM | SYSTOLIC BLOOD PRESSURE: 143 MMHG

## 2022-07-06 DIAGNOSIS — R07.9 CHEST PAIN IN ADULT: ICD-10-CM

## 2022-07-06 LAB
BH CV NUCLEAR PRIOR STUDY: 3
BH CV REST NUCLEAR ISOTOPE DOSE: 10.7 MCI
BH CV STRESS BP STAGE 1: NORMAL
BH CV STRESS COMMENTS STAGE 1: NORMAL
BH CV STRESS DOSE REGADENOSON STAGE 1: 0.4
BH CV STRESS DURATION MIN STAGE 1: 0
BH CV STRESS DURATION SEC STAGE 1: 10
BH CV STRESS HR STAGE 1: 96
BH CV STRESS NUCLEAR ISOTOPE DOSE: 35.3 MCI
BH CV STRESS PROTOCOL 1: NORMAL
BH CV STRESS RECOVERY BP: NORMAL MMHG
BH CV STRESS RECOVERY HR: 84 BPM
BH CV STRESS STAGE 1: 1
LV EF NUC BP: 38 %
MAXIMAL PREDICTED HEART RATE: 161 BPM
PERCENT MAX PREDICTED HR: 59.63 %
STRESS BASELINE BP: NORMAL MMHG
STRESS BASELINE HR: 55 BPM
STRESS PERCENT HR: 70 %
STRESS POST PEAK BP: NORMAL MMHG
STRESS POST PEAK HR: 96 BPM
STRESS TARGET HR: 137 BPM

## 2022-07-06 PROCEDURE — A9500 TC99M SESTAMIBI: HCPCS | Performed by: INTERNAL MEDICINE

## 2022-07-06 PROCEDURE — 0 TECHNETIUM SESTAMIBI: Performed by: INTERNAL MEDICINE

## 2022-07-06 PROCEDURE — 78452 HT MUSCLE IMAGE SPECT MULT: CPT

## 2022-07-06 PROCEDURE — 78452 HT MUSCLE IMAGE SPECT MULT: CPT | Performed by: INTERNAL MEDICINE

## 2022-07-06 PROCEDURE — 93017 CV STRESS TEST TRACING ONLY: CPT

## 2022-07-06 PROCEDURE — 93018 CV STRESS TEST I&R ONLY: CPT | Performed by: INTERNAL MEDICINE

## 2022-07-06 PROCEDURE — 25010000002 REGADENOSON 0.4 MG/5ML SOLUTION: Performed by: INTERNAL MEDICINE

## 2022-07-06 RX ADMIN — TECHNETIUM TC 99M SESTAMIBI 1 DOSE: 1 INJECTION INTRAVENOUS at 11:04

## 2022-07-06 RX ADMIN — REGADENOSON 0.4 MG: 0.08 INJECTION, SOLUTION INTRAVENOUS at 11:00

## 2022-07-06 RX ADMIN — TECHNETIUM TC 99M SESTAMIBI 1 DOSE: 1 INJECTION INTRAVENOUS at 09:45

## 2022-07-11 NOTE — PROGRESS NOTES
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 He reports improvement of generalized body aches, feet achiness and itching with phlebotomy. Medical record indicates Tasia Malhotra was evaluated by Dr. Gavi Guzman 6/1/2022 for complaint of chest pain, palpitations and shortness of breath. He had similar symptoms in the past, which he attributed to anxiety/stress. Echocardiogram 6/6/2022 at [facility] revealed LVEF of 55-60%, trace mitral and aortic regurgitation, normal diastolic function. Suraj Reyna 7/6/2022 had findings consistent with nonischemic cardiomyopathy, LVEF moderately reduced at 38%. Tasia Malhotra was initiated on metoprolol. He denies further symptomology. 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 GERD is stable on lansoprazole. Tasia Malhotra continues aspirin 81 mg daily. H/H is 15.7/50.3. Blood pressure stable. HEMATOLOGIC HISTORY: Secondary erythrocytosis  Mr. Eusebia Zacarias was seen in initial hematologic evaluation in this office on 09/03/13, referred by Dr. Anup Malone for polycythemia. Mr. Eusebia Zacarias was first diagnosed with polycythemia in November 2011 when he presented with a CBC on 10/15/13 with a hemoglobin of 18.9 and hematocrit of 56.8. Platelets and WBC were normal. His workup was performed by Dr. Sachin Fernandez with a negative JAK2 mutation analysis and also negative for BCR/abl. Mr. Eusebia Zacarias does have a 25 pack/year history of smoking however he quit in 2004. Management with periodic phlebotomies has been performed per Dr. Yanci Knight in conjunction with Dr. Karla Saul for the erythrocytosis of unclear etiology.  Mr. Eusebia Zacarias seems to feel that his phlebotomies occasionally improved his symptoms of arthritis but this is yet to be determined if there is a direct correlation between these procedures and his rheumatologic symptoms. Mr. Gabriela Bella hemoglobin at his initial consultation in this office was 11.5 with an MCV of 84.2. The other parameters of his CBC were normal.  A bone marrow biopsy and aspirate was performed on 9/18/14. This was normocellular with trilineage hematopoesis and only mild megakaryocytic hyperplasia. There was NO evidence of JAK2 V617F or Exon 12 mutation. Stainable iron was absent cytogenetics were normal it was felt that his erythrocytosis was secondary. An erythropoietin level was normal at 6.5. A sleep apnea study was done with Dr. Oliverio Galvez on 10/21/2014 revealing mild obstructive sleep apnea and insomnia. Recommendations were to avoid alcohol prior to sleep (Mr. Collins does drink beer regularly, but states he is not concerned about this because it is [employer] light). Continued periodic phlebotomies will be done. CALR and MPL W515 mutations were negative. Past Medical History:   Diagnosis Date    Colon polyposis     COPD (chronic obstructive pulmonary disease) ([facility] [address]     Mild    Diverticulosis     Erythrocytosis     Former smoker     Quit 2004    GERD (gastroesophageal reflux disease)     With Schatzki's ring    Hyperlipidemia     Mixed hyperlipidemia     Nonrheumatic aortic (valve) insufficiency     Nonrheumatic mitral valve regurgitation     PVC (premature ventricular contraction)      Past Surgical History:   Procedure Laterality Date    COLONOSCOPY  04/19/2017    In [address] at [address]  03/12/2013    In [address]     Current Outpatient Medications   Medication Sig Dispense Refill    metoprolol succinate (TOPROL XL) 25 MG extended release tablet Take 25 mg by mouth in the morning.       lansoprazole (PREVACID) 30 MG delayed release capsule TAKE ONE CAPSULE BY MOUTH EVERY MORNING aspirin 81 MG EC tablet Take 81 mg by mouth daily       No current facility-administered medications for this visit. Allergies   Allergen Reactions    Penicillins Rash     Social History     Tobacco Use    Smoking status: Former     Types: Cigarettes     Quit date: 2004     Years since quittin.3    Smokeless tobacco: Never   Vaping Use    Vaping Use: Never used   Substance Use Topics    Drug use: Never     Family History: non-contributory    Review of Systems   Constitutional:  Positive for fatigue. Negative for fever. No night sweats   HENT:  Negative for dental problem, hearing loss, mouth sores, nosebleeds, sore throat and trouble swallowing. Eyes:  Negative for discharge and itching. Respiratory:  Negative for cough, shortness of breath and wheezing. No hemoptysis   Cardiovascular:  Negative for chest pain, palpitations and leg swelling. Gastrointestinal:  Negative for abdominal pain, constipation, diarrhea, nausea and vomiting. GERD - stable   Endocrine: Negative for cold intolerance and heat intolerance. Genitourinary:  Negative for dysuria, frequency, hematuria and urgency. Musculoskeletal:  Positive for arthralgias (generalized aches, feet ache). Negative for joint swelling and myalgias. Skin:  Negative for pallor and rash. Recent sunburn to back   Allergic/Immunologic: Negative for environmental allergies and immunocompromised state. Neurological:  Negative for seizures, syncope and numbness. Hematological:  Negative for adenopathy. Does not bruise/bleed easily. Psychiatric/Behavioral:  Negative for agitation, behavioral problems and confusion. Physical Exam  Vitals reviewed. Constitutional:       General: He is not in acute distress. Appearance: He is well-developed. He is not toxic-appearing or diaphoretic. HENT:      Head: Normocephalic and atraumatic.       Right Ear: External ear normal.      Left Ear: External ear normal. Nose: Nose normal.      Mouth/Throat:      Mouth: Mucous membranes are moist.   Eyes:      General: No scleral icterus. Right eye: No discharge. Left eye: No discharge. Conjunctiva/sclera: Conjunctivae normal.   Neck:      Trachea: No tracheal deviation. Cardiovascular:      Rate and Rhythm: Normal rate and regular rhythm. Pulmonary:      Effort: Pulmonary effort is normal. No respiratory distress. Breath sounds: Normal breath sounds. No wheezing or rales. Chest:   Breasts:     Right: No supraclavicular adenopathy. Left: No supraclavicular adenopathy. Abdominal:      General: Bowel sounds are normal. There is no distension. Palpations: Abdomen is soft. Tenderness: There is no abdominal tenderness. There is no guarding. Genitourinary:     Comments: Exam deferred  Musculoskeletal:         General: No tenderness or deformity. Cervical back: Neck supple. Comments: Normal ROM all four extremities   Lymphadenopathy:      Cervical: No cervical adenopathy. Right cervical: No superficial cervical adenopathy. Left cervical: No superficial cervical adenopathy. Upper Body:      Right upper body: No supraclavicular adenopathy. Left upper body: No supraclavicular adenopathy. Comments:      Skin:     General: Skin is warm and dry. Findings: No rash. Comments: Skin peeling on posterior chest wall related to recent sunburn. No suspicious skin lesions noted. Neurological:      Mental Status: He is alert and oriented to person, place, and time. Comments: follows commands, non-focal   Psychiatric:         Behavior: Behavior normal. Behavior is cooperative. Thought Content: Thought content normal.         Judgment: Judgment normal.      Comments: Alert and oriented to person, place and time.      Vitals:    08/04/22 0932   BP: 138/70   Site: Right Upper Arm   Position: Sitting   Pulse: 63   SpO2: 95%   Weight: 165 lb 6.4 oz (75 kg)   Height: 5' 10\" (1.778 m)      Wt Readings from Last 3 Encounters:   08/04/22 165 lb 6.4 oz (75 kg)   12/13/21 169 lb 12.8 oz (77 kg)   06/21/21 169 lb 4.8 oz (76.8 kg)     LABS:  CBC 8/4/2022: WBC 8.33, H/H15.7/50.3, platelets 753,173    ASSESSMENT/PLAN:    1. Secondary erythrocytosis    2. Chronic fatigue    3. Multiple joint pain    4. Moderate sun exposure, initial encounter       Secondary erythrocytosis  H/H 15.7/50.3  phlebotomy today, per patient request for symptom control  He will be monitored conservatively with phlebotomy every 6 months PRN for symptom control  Chronic fatigue, multiple joint pain, itching  Monitor symptoms with phlebotomy    Moderate sun exposure, initial encounter  Discussed importance of skin care with frequent sun exposure (farming)  Skin peeling on posterior chest.  No visible suspicious skin lesions      Health Maintenance  PSA monitored by Dr. Dinorah Henderson, according to patient  Colonoscopy 4/19/2017 in [place], [place]. Patient plans to schedule in 2023. Return in about 6 months (around 2/4/2023) for follow up with JAMES Hernandez for phlebotomy . I have seen, examined and reviewed this patient medication list, appropriate labs and imaging studies. I reviewed relevant medical records and others physicians notes. I discussed the plans of care with the patient. I answered all the questions to the patients satisfaction. I have also reviewed the chief complaint (CC) and part of the history (History of Present Illness (HPI), Past Family Social History [facility]), or Review of Systems (ROS) and made changes when appropriated. Dictated utilizing Dragon transcription software. Office note from Dr. Nely Alford reviewed.       JAMES Davidson  9:32 PM  8/7/2022

## 2022-07-12 ENCOUNTER — OFFICE VISIT (OUTPATIENT)
Dept: CARDIOLOGY | Facility: CLINIC | Age: 62
End: 2022-07-12

## 2022-07-12 VITALS
OXYGEN SATURATION: 98 % | HEIGHT: 70 IN | HEART RATE: 76 BPM | SYSTOLIC BLOOD PRESSURE: 110 MMHG | BODY MASS INDEX: 23.34 KG/M2 | WEIGHT: 163 LBS | DIASTOLIC BLOOD PRESSURE: 72 MMHG

## 2022-07-12 DIAGNOSIS — I49.3 PVC (PREMATURE VENTRICULAR CONTRACTION): ICD-10-CM

## 2022-07-12 DIAGNOSIS — Z82.49 FAMILY HISTORY OF EARLY CAD: ICD-10-CM

## 2022-07-12 DIAGNOSIS — Z87.891 EX-SMOKER FOR MORE THAN 1 YEAR: ICD-10-CM

## 2022-07-12 DIAGNOSIS — I34.0 NONRHEUMATIC MITRAL VALVE REGURGITATION: Primary | ICD-10-CM

## 2022-07-12 DIAGNOSIS — E78.2 MIXED HYPERLIPIDEMIA: ICD-10-CM

## 2022-07-12 DIAGNOSIS — I35.1 NONRHEUMATIC AORTIC VALVE INSUFFICIENCY: ICD-10-CM

## 2022-07-12 PROCEDURE — 99214 OFFICE O/P EST MOD 30 MIN: CPT | Performed by: NURSE PRACTITIONER

## 2022-07-12 NOTE — PROGRESS NOTES
Subjective:     Encounter Date: 07/12/2022      Patient ID: Tyler Chin is a 61 y.o. male with strong family history of early coronary artery disease, and former smoker     Chief Complaint: test results  History of Present Illness  Patient presents today for test results. Patient was seen by Dr Bruce on 6/1/2022 for chest pain, palpitations and shortness of breath. Patient reports that he had followed with his PCP Dr De Jesus and his blood pressure, blood sugar and cholesterol had been elevated. He states that he has been told in the past about an arrhythmia but not sure what exactly it is. He was referred to Dr Bruce for testing and consultation. He wore a holter monitor, he reports that he wore the holter monitor for 6 days of the 14 days in June. He reports that he didn't have any symptoms of palpitations or heart racing during the monitoring period. 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 He had an echo 6/6/2022 at [facility] that showed LVEF 55-60%, trace mitral and aortic regurgitation, normal diastolic function. He underwent a Lexiscan on 7/6/2022 that had findings consistent with nonischemic cardiomyopathy.   Patient reports that he has been doing well since last appointment. He reports that he has had episodes in the past where he feels his heart beating very fast and erratic. He attributed this to anxiety and thought these were panic attacks in the past. He reports that he would just sit and rest and these would subside on their own. He denies any chest pain. He states that he had one episode a couple of weeks ago where he was outside working under a piece of machinery and got hot and felt a lot of pain down his left side of his ribs. He reports that it resolved on its own. He denies any dyspnea on exertion, leg swelling, orthopnea or PND. He reports that his wife has been monitoring his blood sugar and it has been more controlled at home. Patient follows  with Dr De Jesus as PCP.     The following portions of the patient's history were reviewed and updated as appropriate: allergies, current medications, past family history, past medical history, past social history, past surgical history and problem list.    Allergies   Allergen Reactions   • Penicillins Rash       Current Outpatient Medications:   •  aspirin 81 MG EC tablet, Take 81 mg by mouth., Disp: , Rfl:   •  pantoprazole (PROTONIX) 40 MG EC tablet, Take 40 mg by mouth Every Morning., Disp: , Rfl:   Past Medical History:   Diagnosis Date   • Polycythemia      Social History     Socioeconomic History   • Marital status:    Tobacco Use   • Smoking status: Never Smoker   • Smokeless tobacco: Never Used   Vaping Use   • Vaping Use: Never used   Substance and Sexual Activity   • Alcohol use: Yes     Comment: occasional   • Drug use: Never   • Sexual activity: Defer       Review of Systems   Constitutional: Negative for malaise/fatigue.   HENT: Negative for nosebleeds.    Cardiovascular: Negative for chest pain, dyspnea on exertion, irregular heartbeat, leg swelling, near-syncope, orthopnea, palpitations, paroxysmal nocturnal dyspnea and syncope.   Respiratory: Negative for shortness of breath.    Hematologic/Lymphatic: Does not bruise/bleed easily.   Genitourinary: Negative for hematuria and urgency.   Neurological: Negative for dizziness, headaches, light-headedness and weakness.   Psychiatric/Behavioral: The patient is not nervous/anxious.    All other systems reviewed and are negative.         Objective:     Vitals reviewed.   Constitutional:       General: Not in acute distress.     Appearance: Normal appearance. Well-developed.   Eyes:      Pupils: Pupils are equal, round, and reactive to light.   HENT:      Head: Normocephalic and atraumatic.      Nose: Nose normal.   Neck:      Vascular: No carotid bruit.   Pulmonary:      Effort: Pulmonary effort is normal. No respiratory distress.      Breath sounds:  "Normal breath sounds. No wheezing. No rales.   Cardiovascular:      Normal rate. Regular rhythm.      Murmurs: There is no murmur.   Edema:     Peripheral edema absent.   Abdominal:      General: There is no distension.      Palpations: Abdomen is soft.   Musculoskeletal: Normal range of motion.      Cervical back: Normal range of motion and neck supple. Skin:     General: Skin is warm.      Findings: No erythema or rash.   Neurological:      General: No focal deficit present.      Mental Status: Alert and oriented to person, place, and time.   Psychiatric:         Attention and Perception: Attention normal.         Mood and Affect: Mood normal.         Speech: Speech normal.         Behavior: Behavior normal.         Thought Content: Thought content normal.         Judgment: Judgment normal.         /72   Pulse 76   Ht 177.8 cm (70\")   Wt 73.9 kg (163 lb)   SpO2 98%   BMI 23.39 kg/m²     Procedures    Lab Review:       Echo 6/12/2022: [facility]      lexiscan 7/6/2022:  Interpretation Summary  · Diaphragmatic attenuation artifact is present.  · Left ventricular ejection fraction is moderately reduced. (Calculated EF = 38%).  · Normal perfusion  · Findings may be consistent with nonischemic cardiomyopathy    Lipid panel       I have personally reviewed Lexiscan, echo, labs and past office notes prior to patients visit  Assessment:          Diagnosis Plan   1. Nonrheumatic mitral valve regurgitation     2. Nonrheumatic aortic valve insufficiency     3. PVC (premature ventricular contraction)     4. Mixed hyperlipidemia     5. Family history of early CAD     6. Ex-smoker for more than 1 year            Plan:       1. Mitral valve regurgitation: mild on echo 6/6/2022    2. Aortic valve regurgitation: mild of echo 6/6/2022    3. PVC: noted on EKG from LOV. holter monitor is pending to be read. Will call patient with results and further treatment changes    4. Hyperlipidemia: . PCP had offered statin " therapy and patient declined. Discussed Lifestyle modifications with patient.    5. Family history of early CAD    6. Former smoker: quit smoking >1 year ago.     Patient is to follow up in 6 months or sooner if needed

## 2022-07-20 RX ORDER — METOPROLOL SUCCINATE 25 MG/1
25 TABLET, EXTENDED RELEASE ORAL DAILY
Qty: 30 TABLET | Refills: 11 | Status: SHIPPED | OUTPATIENT
Start: 2022-07-20

## 2022-07-20 NOTE — PROGRESS NOTES
Called and discussed Holter monitor results with patient.      I am starting patient on metoprolol xl 25 mg daily for palpitations. He is to monitor and keep previously scheduled follow up. If he develops new or worsening symptoms then he is to call the office to be seen sooner. Patient verbalized understanding. Medication sent to [facility] her patient request

## 2022-08-04 ENCOUNTER — OFFICE VISIT (OUTPATIENT)
Dept: HEMATOLOGY | Age: 62
End: 2022-08-04
Payer: COMMERCIAL

## 2022-08-04 ENCOUNTER — HOSPITAL ENCOUNTER (OUTPATIENT)
Dept: INFUSION THERAPY | Age: 62
Discharge: HOME | End: 2022-08-04
Payer: COMMERCIAL

## 2022-08-04 VITALS
HEIGHT: 70 IN | SYSTOLIC BLOOD PRESSURE: 138 MMHG | BODY MASS INDEX: 23.68 KG/M2 | WEIGHT: 165.4 LBS | DIASTOLIC BLOOD PRESSURE: 70 MMHG | HEART RATE: 63 BPM | OXYGEN SATURATION: 95 %

## 2022-08-04 DIAGNOSIS — R53.82 CHRONIC FATIGUE: ICD-10-CM

## 2022-08-04 DIAGNOSIS — D75.1 SECONDARY ERYTHROCYTOSIS: Primary | ICD-10-CM
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
## 2022-08-04 DIAGNOSIS — M25.50 MULTIPLE JOINT PAIN: ICD-10-CM

## 2022-08-04 DIAGNOSIS — D75.1 ERYTHROCYTOSIS: ICD-10-CM

## 2022-08-04 LAB
BASOPHILS ABSOLUTE: 0.1 K/UL (ref 0.01–0.08)
BASOPHILS RELATIVE PERCENT: 1.2 % (ref 0.1–1.2)
EOSINOPHILS ABSOLUTE: 0.33 K/UL (ref 0.04–0.54)
EOSINOPHILS RELATIVE PERCENT: 4 % (ref 0.7–7)
HCT VFR BLD CALC: 50.3 % (ref 40.1–51)
HEMOGLOBIN: 15.7 G/DL (ref 13.7–17.5)
LYMPHOCYTES ABSOLUTE: 1.74 K/UL (ref 1.18–3.74)
LYMPHOCYTES RELATIVE PERCENT: 20.9 % (ref 19.3–53.1)
MCH RBC QN AUTO: 29.5 PG (ref 25.7–32.2)
MCHC RBC AUTO-ENTMCNC: 31.2 G/DL (ref 32.3–36.5)
MCV RBC AUTO: 94.4 FL (ref 79–92.2)
MONOCYTES ABSOLUTE: 0.68 K/UL (ref 0.24–0.82)
MONOCYTES RELATIVE PERCENT: 8.2 % (ref 4.7–12.5)
NEUTROPHILS ABSOLUTE: 5.46 K/UL (ref 1.56–6.13)
NEUTROPHILS RELATIVE PERCENT: 65.5 % (ref 34–71.1)
PDW BLD-RTO: 13.2 % (ref 11.6–14.4)
PLATELET # BLD: 197 K/UL (ref 163–337)
PMV BLD AUTO: 10.7 FL (ref 7.4–10.4)
RBC # BLD: 5.33 M/UL (ref 4.63–6.08)
WBC # BLD: 8.33 K/UL (ref 4.23–9.07)

## 2022-08-04 PROCEDURE — 99211 OFF/OP EST MAY X REQ PHY/QHP: CPT

## 2022-08-04 PROCEDURE — 36415 COLL VENOUS BLD VENIPUNCTURE: CPT

## 2022-08-04 PROCEDURE — 85025 COMPLETE CBC W/AUTO DIFF WBC: CPT

## 2022-08-04 PROCEDURE — 99214 OFFICE O/P EST MOD 30 MIN: CPT | Performed by: NURSE PRACTITIONER

## 2022-08-04 PROCEDURE — 99195 PHLEBOTOMY: CPT

## 2022-08-04 RX ORDER — METOPROLOL SUCCINATE 25 MG/1
25 TABLET, EXTENDED RELEASE ORAL DAILY
COMMUNITY

## 2022-08-04 ASSESSMENT — ENCOUNTER SYMPTOMS
ABDOMINAL PAIN: 0
TROUBLE SWALLOWING: 0
COUGH: 0
WHEEZING: 0
CONSTIPATION: 0
SHORTNESS OF BREATH: 0
VOMITING: 0
EYE DISCHARGE: 0
DIARRHEA: 0
NAUSEA: 0
EYE ITCHING: 0
SORE THROAT: 0

## 2022-08-04 NOTE — PROGRESS NOTES
THERAPEUTIC PHLEBOTOMY    Most recent Hemoglobin 16.0Gm/dl and Hematocrit 48.7%    Date obtained: 12 /31 /21      Pre-phlebotomy Vital Signs: Refer to Doc flow sheet    Start time: 1005    Tourniquet placed on patient's arm and arm palpated for venous access. Area of venous access cleansed with alcohol. Sheath removed from the needle and needle inserted into the left antecubital site. Blood flowing well down the tubing into collection bag. Adjustment/no adjustment of needle needed to maintain adequate blood flow. Scale monitoring amount of blood in bag. Stop Time: 1020  Needle removed from patient's arm. Pressure applied to patient's arm until bleeding stopped. Dry sterile dressing applied over puncture site and secured with Coban/self-adherent wrap. Patient tolerated procedure well. No redness, edema, or signs of active bleeding noted. Discharge Instructions provided: No heavy pushing or lifting for the next 24 hours. Keep dressing dry and in place for 4 to 6 hours. If a fever GREATER than 100.5 develops, contact office. Patient discharged ambulatory with belongings.

## 2023-01-24 ENCOUNTER — OFFICE VISIT (OUTPATIENT)
Dept: CARDIOLOGY | Facility: CLINIC | Age: 63
End: 2023-01-24
Payer: COMMERCIAL

## 2023-01-24 VITALS
HEIGHT: 70 IN | OXYGEN SATURATION: 98 % | WEIGHT: 169 LBS | BODY MASS INDEX: 24.2 KG/M2 | DIASTOLIC BLOOD PRESSURE: 60 MMHG | HEART RATE: 61 BPM | SYSTOLIC BLOOD PRESSURE: 119 MMHG

## 2023-01-24 DIAGNOSIS — I34.0 NONRHEUMATIC MITRAL VALVE REGURGITATION: ICD-10-CM

## 2023-01-24 DIAGNOSIS — Z87.891 EX-SMOKER FOR MORE THAN 1 YEAR: ICD-10-CM

## 2023-01-24 DIAGNOSIS — I49.3 PVC (PREMATURE VENTRICULAR CONTRACTION): ICD-10-CM

## 2023-01-24 DIAGNOSIS — I47.1 PAROXYSMAL SVT (SUPRAVENTRICULAR TACHYCARDIA): Primary | ICD-10-CM

## 2023-01-24 DIAGNOSIS — E78.2 MIXED HYPERLIPIDEMIA: ICD-10-CM

## 2023-01-24 DIAGNOSIS — I35.1 NONRHEUMATIC AORTIC VALVE INSUFFICIENCY: ICD-10-CM

## 2023-01-24 DIAGNOSIS — I47.29 NONSUSTAINED VENTRICULAR TACHYCARDIA: ICD-10-CM

## 2023-01-24 DIAGNOSIS — Z82.49 FAMILY HISTORY OF EARLY CAD: ICD-10-CM

## 2023-01-24 PROBLEM — I47.10 PAROXYSMAL SVT (SUPRAVENTRICULAR TACHYCARDIA): Status: ACTIVE | Noted: 2023-01-24

## 2023-01-24 PROCEDURE — 99214 OFFICE O/P EST MOD 30 MIN: CPT | Performed by: NURSE PRACTITIONER

## 2023-01-24 NOTE — PROGRESS NOTES
Subjective:     Encounter Date: 01/24/2023      Patient ID: Tyler Chin is a 62 y.o. male with strong family history of early coronary artery disease, and former smoker     Chief Complaint: routine follow up  History of Present Illness  Patient presents today for management of SVT. Today patient reports that he has been doing well. He denies any chest pain. He denies any dyspnea on exertion. He denies any heart racing or palpitations. He denies monitoring his blood pressure at home. He denies any leg swelling, othropnea or PND. He denies any dizziness or lightheadedness. Patient follows with Dr De Jesus as PCP.     The following portions of the patient's history were reviewed and updated as appropriate: allergies, current medications, past family history, past medical history, past social history, past surgical history and problem list.    Allergies   Allergen Reactions   • Penicillins Rash       Current Outpatient Medications:   •  aspirin 81 MG EC tablet, Take 81 mg by mouth., Disp: , Rfl:   •  metoprolol succinate XL (TOPROL-XL) 25 MG 24 hr tablet, Take 1 tablet by mouth Daily., Disp: 30 tablet, Rfl: 11  •  pantoprazole (PROTONIX) 40 MG EC tablet, Take 40 mg by mouth Every Morning., Disp: , Rfl:   Past Medical History:   Diagnosis Date   • Polycythemia      Social History     Socioeconomic History   • Marital status:    Tobacco Use   • Smoking status: Never   • Smokeless tobacco: Never   Vaping Use   • Vaping Use: Never used   Substance and Sexual Activity   • Alcohol use: Yes     Comment: occasional   • Drug use: Never   • Sexual activity: Defer       Review of Systems   Constitutional: Negative for malaise/fatigue.   HENT: Negative for nosebleeds.    Cardiovascular: Negative for chest pain, dyspnea on exertion, irregular heartbeat, leg swelling, near-syncope, orthopnea, palpitations, paroxysmal nocturnal dyspnea and syncope.   Respiratory: Negative for shortness of breath.    Hematologic/Lymphatic:  "Does not bruise/bleed easily.   Genitourinary: Negative for hematuria and urgency.   Neurological: Negative for dizziness, headaches, light-headedness and weakness.   Psychiatric/Behavioral: The patient is not nervous/anxious.    All other systems reviewed and are negative.         Objective:     Vitals reviewed.   Constitutional:       General: Not in acute distress.     Appearance: Normal appearance. Well-developed.   Eyes:      Pupils: Pupils are equal, round, and reactive to light.   HENT:      Head: Normocephalic and atraumatic.      Nose: Nose normal.   Neck:      Vascular: No carotid bruit.   Pulmonary:      Effort: Pulmonary effort is normal. No respiratory distress.      Breath sounds: Normal breath sounds. No wheezing. No rales.   Cardiovascular:      Normal rate. Regular rhythm.      Murmurs: There is no murmur.   Edema:     Peripheral edema absent.   Abdominal:      General: There is no distension.      Palpations: Abdomen is soft.   Musculoskeletal: Normal range of motion.      Cervical back: Normal range of motion and neck supple. Skin:     General: Skin is warm.      Findings: No erythema or rash.   Neurological:      General: No focal deficit present.      Mental Status: Alert and oriented to person, place, and time.   Psychiatric:         Attention and Perception: Attention normal.         Mood and Affect: Mood normal.         Speech: Speech normal.         Behavior: Behavior normal.         Thought Content: Thought content normal.         Judgment: Judgment normal.         /60   Pulse 61   Ht 177.8 cm (70\")   Wt 76.7 kg (169 lb)   SpO2 98%   BMI 24.25 kg/m²     Procedures    Lab Review:       Echo 6/12/2022: [facility]      Heart monitor 6/2022:      lexiscan 7/6/2022:  Interpretation Summary  · Diaphragmatic attenuation artifact is present.  · Left ventricular ejection fraction is moderately reduced. (Calculated EF = 38%).  · Normal perfusion  · Findings may be consistent with " nonischemic cardiomyopathy    Lipid panel       I have personally reviewed heart monitor, Lexiscan, echo, labs and past office notes prior to patients visit  Assessment:          Diagnosis Plan   1. Paroxysmal SVT (supraventricular tachycardia) (HCC)        2. Nonsustained ventricular tachycardia        3. Nonrheumatic mitral valve regurgitation        4. Nonrheumatic aortic valve insufficiency        5. PVC (premature ventricular contraction)        6. Mixed hyperlipidemia        7. Family history of early CAD        8. Ex-smoker for more than 1 year               Plan:       1. Paroxysmal SVT: On holter monitor from 6/2022. Was started on metoprolol. Patient denies any palpitations or heart racing.     2. Nonsustained ventricular tachycardia: noted on holter monitor 6/2022. Low risk nuclear stress test done 7/6/2022.     3. Mitral valve regurgitation: mild on echo 6/6/2022. Will continue to monitor routinely     4. Aortic valve regurgitation: mild of echo 6/6/2022. Will continue to monitor routinely    5. PVC: noted on EKG previously. 2.5% burden on holter monitor 6/2022.     6. Hyperlipidemia: . PCP had offered statin therapy and patient declined. Discussed Lifestyle modifications with patient. Offered to recheck but patient reported he would follow up with PCP.     7. Family history of early CAD    8. Former smoker: quit smoking >1 year ago.     I attest that all portions of this note reviewed and all information has been updated by myself to reflect the patient's current status.      I spent 37 minutes caring for Tyler on this date of service. This time includes time spent by me in the following activities:preparing for the visit, reviewing tests, obtaining and/or reviewing a separately obtained history, performing a medically appropriate examination and/or evaluation , counseling and educating the patient/family/caregiver and documenting information in the medical record    I spent 2 minutes on the  separately reported service of EKG. This time is not included in the time used to support the E/M service also reported today.    Patient is to follow up in 1 year or sooner if needed

## 2023-02-23 NOTE — PROGRESS NOTES
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 He has improvement in generalized body aches, feet achiness and itching with phlebotomies. H/H has slightly increased since his last office visit 6 months ago, currently 16.6/52. 2. Blood pressure is stable. Deepa Charles states he underwent bilateral cataract surgery November, 2022. Since that time he has had blurred vision on the right and double vision on the left. 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 Deepa Charles is otherwise without new complaint. He continues both Toprol managed by cardiology as well as aspirin 81 mg daily. HEMATOLOGIC HISTORY: Secondary erythrocytosis  Mr. Collins was seen in initial hematologic evaluation in this office on 09/03/13, referred by Dr. Jessa Asencio for polycythemia. Mr. Collins was first diagnosed with polycythemia in November 2011 when he presented with a CBC on 10/15/13 with a hemoglobin of 18.9 and hematocrit of 56.8. Platelets and WBC were normal. His workup was performed by Dr. Morgan Cid with a negative JAK2 mutation analysis and also negative for BCR/abl. Mr. Collins does have a 25 pack/year history of smoking however he quit in 2004. Management with periodic phlebotomies has been performed per Dr. Lonnie Careron in conjunction with Dr. Kenneth Nielsen for the erythrocytosis of unclear etiology. Mr. Collins seems to feel that his phlebotomies occasionally improved his symptoms of arthritis but this is yet to be determined if there is a direct correlation between these procedures and his rheumatologic symptoms.   Mr. Allen Mines hemoglobin at his initial consultation in this office was 11.5 with an MCV of 84.2. The other parameters of his CBC were normal.  A bone marrow biopsy and aspirate was performed on 9/18/14. This was normocellular with trilineage hematopoesis and only mild megakaryocytic hyperplasia. There was NO evidence of JAK2 V617F or Exon 12 mutation. Stainable iron was absent cytogenetics were normal it was felt that his erythrocytosis was secondary. An erythropoietin level was normal at 6.5. A sleep apnea study was done with Dr. Mark Michelle on 10/21/2014 revealing mild obstructive sleep apnea and insomnia. Recommendations were to avoid alcohol prior to sleep (Mr. Collins does drink beer regularly, but states he is not concerned about this because it is [employer] light). Continued periodic phlebotomies will be done. CALR and MPL W515 mutations were negative. Past Medical History:   Diagnosis Date    Cataract (lens) fragments in eye following cataract surgery, bilateral     Colon polyposis     COPD (chronic obstructive pulmonary disease) ([facility] [address]     Mild    Diverticulosis     Erythrocytosis     Former smoker     Quit 2004    GERD (gastroesophageal reflux disease)     With Schatzki's ring    Hyperlipidemia     Mixed hyperlipidemia     Nonrheumatic aortic (valve) insufficiency     Nonrheumatic mitral valve regurgitation     PVC (premature ventricular contraction)      Past Surgical History:   Procedure Laterality Date    CATARACT EXTRACTION, BILATERAL      COLONOSCOPY  04/19/2017    [address]  03/12/2013    In [address]     Current Outpatient Medications   Medication Sig Dispense Refill    metoprolol succinate (TOPROL XL) 25 MG extended release tablet Take 25 mg by mouth in the morning.       lansoprazole (PREVACID) 30 MG delayed release capsule TAKE ONE CAPSULE BY MOUTH EVERY MORNING      aspirin 81 MG EC tablet Take 81 mg by mouth daily       No current facility-administered medications for this visit. Allergies   Allergen Reactions    Penicillins Rash     Social History     Tobacco Use    Smoking status: Former     Packs/day: 1.00     Years: 20.00     Pack years: 20.00     Types: Cigarettes     Quit date: 2004     Years since quittin.9    Smokeless tobacco: Never   Vaping Use    Vaping Use: Never used   Substance Use Topics    Alcohol use: Yes     Comment: on occasion    Drug use: Never     Family History: non-contributory    Review of Systems   Constitutional:  Positive for fatigue. Negative for fever. No night sweats   HENT:  Negative for dental problem, hearing loss, mouth sores, nosebleeds, sore throat and trouble swallowing. Eyes:  Negative for discharge and itching. Blurred vision on the right, double vision on left. S/p bilateral cataract surgery 2022   Respiratory:  Negative for cough, shortness of breath and wheezing. Cardiovascular:  Negative for chest pain, palpitations and leg swelling. Gastrointestinal:  Negative for abdominal pain, constipation, diarrhea, nausea and vomiting. GERD - stable   Endocrine: Negative for cold intolerance and heat intolerance. Genitourinary:  Negative for dysuria, frequency, hematuria and urgency. Musculoskeletal:  Positive for arthralgias (generalized aches, feet ache). Negative for joint swelling and myalgias. Skin:  Negative for pallor and rash. Allergic/Immunologic: Negative for environmental allergies and immunocompromised state. Neurological:  Negative for seizures, syncope and numbness. Hematological:  Negative for adenopathy. Does not bruise/bleed easily. Psychiatric/Behavioral:  Negative for agitation, behavioral problems and confusion. Physical Exam  Vitals reviewed. Constitutional:       General: He is not in acute distress. Appearance: He is well-developed. He is not toxic-appearing or diaphoretic.    HENT:      Head: Normocephalic and atraumatic. Right Ear: External ear normal.      Left Ear: External ear normal.      Nose: Nose normal.      Mouth/Throat:      Mouth: Mucous membranes are moist.   Eyes:      General: No scleral icterus. Right eye: No discharge. Left eye: No discharge. Conjunctiva/sclera: Conjunctivae normal.   Neck:      Trachea: No tracheal deviation. Cardiovascular:      Rate and Rhythm: Normal rate and regular rhythm. Pulmonary:      Effort: Pulmonary effort is normal. No respiratory distress. Breath sounds: Normal breath sounds. No wheezing or rales. Abdominal:      General: Bowel sounds are normal. There is no distension. Palpations: Abdomen is soft. Tenderness: There is no abdominal tenderness. There is no guarding. Genitourinary:     Comments: Exam deferred  Musculoskeletal:         General: No tenderness or deformity. Cervical back: Neck supple. Comments: Normal ROM all four extremities   Lymphadenopathy:      Cervical: No cervical adenopathy. Right cervical: No superficial cervical adenopathy. Left cervical: No superficial cervical adenopathy. Upper Body:      Right upper body: No supraclavicular adenopathy. Left upper body: No supraclavicular adenopathy. Comments:      Skin:     General: Skin is warm and dry. Findings: No rash. Comments: Skin peeling on posterior chest wall related to recent sunburn. No suspicious skin lesions noted. Neurological:      Mental Status: He is alert and oriented to person, place, and time. Comments: follows commands, non-focal   Psychiatric:         Behavior: Behavior normal. Behavior is cooperative. Thought Content: Thought content normal.         Judgment: Judgment normal.      Comments: Alert and oriented to person, place and time.      Vitals:    02/24/23 0843   BP: 120/76   Pulse: 60   SpO2: 93%   Weight: 166 lb 3.2 oz (75.4 kg)   Height: 5' 10\" (1.778 m)      Wt Readings from Last 3 Encounters:   02/24/23 166 lb 3.2 oz (75.4 kg)   08/04/22 165 lb 6.4 oz (75 kg)   12/13/21 169 lb 12.8 oz (77 kg)     LABS:  CBC 2/24/2023:  Lab Results   Component Value Date    WBC 9.50 (H) 02/24/2023    HGB 16.6 02/24/2023    HCT 52.2 (HH) 02/24/2023    MCV 92.9 (H) 02/24/2023     02/24/2023    LYMPHOPCT 18.7 (L) 02/24/2023    RBC 5.62 02/24/2023    MCH 29.5 02/24/2023    MCHC 31.8 (L) 02/24/2023    RDW 12.9 02/24/2023     Lab Results   Component Value Date    NEUTROABS 6.30 (H) 02/24/2023     Patient had Cardiology follow up 1/24/2023 with JAMES Herron  at  [facility]: stable exam, continue to monitor. ASSESSMENT/PLAN:    1. Secondary erythrocytosis    2. Chronic fatigue    3. Multiple joint pain         Secondary erythrocytosis  H/H 16.6/52. 2  phlebotomy today, per patient request for symptom control  He will be monitored conservatively with phlebotomy every 6 months PRN for symptom control  Chronic fatigue, multiple joint pain, itching  Monitor symptoms with phlebotomy    Health Maintenance  PSA monitored by Dr. Ivet Kauffman, according to patient  Colonoscopy 4/19/2017 in [place], [place]. Patient plans to schedule this year, 2023. Return in about 6 months (around 8/24/2023) for follow up with JAMES Johns with phlebotomy. The total time (20 min) I spent to see the patient today includes at least one or more of the following: preparing to see the patient by reviewing prior tests, prior notes or other relevant information, performing appropriate independent examination and evaluation, counseling, ordering of medications, tests or procedures, referrals, communicating with other healthcare professionals when appropriated to coordinate care, documenting clinic information in the electronic medical record or other health records, independently interpreting results of tests, managing test results and communicating the results to the patient/family or caregiver.      (Please note that portions of this note were completed with a voice recognition program. Efforts were made to edit the dictations but occasionally words are mis-transcribed.)      JAMES Holbrook  6:24 PM  2/25/2023

## 2023-02-24 ENCOUNTER — OFFICE VISIT (OUTPATIENT)
Dept: HEMATOLOGY | Age: 63
End: 2023-02-24
Payer: COMMERCIAL

## 2023-02-24 ENCOUNTER — CLINICAL DOCUMENTATION (OUTPATIENT)
Dept: HEMATOLOGY | Age: 63
End: 2023-02-24

## 2023-02-24 ENCOUNTER — HOSPITAL ENCOUNTER (OUTPATIENT)
Dept: INFUSION THERAPY | Age: 63
Discharge: HOME | End: 2023-02-24
Payer: COMMERCIAL

## 2023-02-24 VITALS
SYSTOLIC BLOOD PRESSURE: 120 MMHG | WEIGHT: 166.2 LBS | HEIGHT: 70 IN | HEART RATE: 60 BPM | DIASTOLIC BLOOD PRESSURE: 76 MMHG | OXYGEN SATURATION: 93 % | BODY MASS INDEX: 23.79 KG/M2

## 2023-02-24 DIAGNOSIS — D75.1 SECONDARY ERYTHROCYTOSIS: ICD-10-CM

## 2023-02-24 DIAGNOSIS — D75.1 SECONDARY ERYTHROCYTOSIS: Primary | ICD-10-CM

## 2023-02-24 DIAGNOSIS — M25.50 MULTIPLE JOINT PAIN: ICD-10-CM

## 2023-02-24 DIAGNOSIS — R53.82 CHRONIC FATIGUE: ICD-10-CM

## 2023-02-24 LAB
BASOPHILS ABSOLUTE: 0.12 K/UL (ref 0.01–0.08)
BASOPHILS RELATIVE PERCENT: 1.3 % (ref 0.1–1.2)
EOSINOPHILS ABSOLUTE: 0.41 K/UL (ref 0.04–0.54)
EOSINOPHILS RELATIVE PERCENT: 4.3 % (ref 0.7–7)
HCT VFR BLD CALC: 52.2 % (ref 40.1–51)
HEMOGLOBIN: 16.6 G/DL (ref 13.7–17.5)
LYMPHOCYTES ABSOLUTE: 1.78 K/UL (ref 1.18–3.74)
LYMPHOCYTES RELATIVE PERCENT: 18.7 % (ref 19.3–53.1)
MCH RBC QN AUTO: 29.5 PG (ref 25.7–32.2)
MCHC RBC AUTO-ENTMCNC: 31.8 G/DL (ref 32.3–36.5)
MCV RBC AUTO: 92.9 FL (ref 79–92.2)
MONOCYTES ABSOLUTE: 0.87 K/UL (ref 0.24–0.82)
MONOCYTES RELATIVE PERCENT: 9.2 % (ref 4.7–12.5)
NEUTROPHILS ABSOLUTE: 6.3 K/UL (ref 1.56–6.13)
NEUTROPHILS RELATIVE PERCENT: 66.3 % (ref 34–71.1)
PDW BLD-RTO: 12.9 % (ref 11.6–14.4)
PLATELET # BLD: 268 K/UL (ref 163–337)
PMV BLD AUTO: 9.6 FL (ref 7.4–10.4)
RBC # BLD: 5.62 M/UL (ref 4.63–6.08)
WBC # BLD: 9.5 K/UL (ref 4.23–9.07)

## 2023-02-24 PROCEDURE — 99195 PHLEBOTOMY: CPT

## 2023-02-24 PROCEDURE — 36415 COLL VENOUS BLD VENIPUNCTURE: CPT

## 2023-02-24 PROCEDURE — 99213 OFFICE O/P EST LOW 20 MIN: CPT | Performed by: NURSE PRACTITIONER

## 2023-02-24 PROCEDURE — 85025 COMPLETE CBC W/AUTO DIFF WBC: CPT

## 2023-02-24 PROCEDURE — 99211 OFF/OP EST MAY X REQ PHY/QHP: CPT

## 2023-02-24 ASSESSMENT — ENCOUNTER SYMPTOMS
CONSTIPATION: 0
COUGH: 0
DIARRHEA: 0
SORE THROAT: 0
VOMITING: 0
ABDOMINAL PAIN: 0
SHORTNESS OF BREATH: 0
TROUBLE SWALLOWING: 0
EYE DISCHARGE: 0
WHEEZING: 0
EYE ITCHING: 0
NAUSEA: 0

## 2023-02-24 NOTE — PROGRESS NOTES
THERAPEUTIC PHLEBOTOMY    Most recent Hemoglobin 16.6Gm/dl and Hematocrit 52.2%    Date obtained: 2 /24 /2023    Pre-phlebotomy Vital Signs: Refer to Doc flow sheet    Start time: 0935    Tourniquet placed on patient's arm and arm palpated for venous access. Area of venous access cleansed with alcohol. Sheath removed from the needle and needle inserted into the right antecubital site. Blood flowing well down the tubing into collection bag. Adjustment/no adjustment of needle needed to maintain adequate blood flow. Scale monitoring amount of blood in bag. Stop Time: 0713  Needle removed from patient's arm. Pressure applied to patient's arm until bleeding stopped. Dry sterile dressing applied over puncture site and secured with Coban/self-adherent wrap. Final amount of blood removed: 500      Patient tolerated procedure well. No redness, edema, or signs of active bleeding noted. Discharge Instructions provided: No heavy pushing or lifting for the next 24 hours. Keep dressing dry and in place for 4 to 6 hours. If a fever GREATER than 100.5 develops, contact office. Patient discharged ambulatory with belongings.

## 2023-08-15 RX ORDER — METOPROLOL SUCCINATE 25 MG/1
TABLET, EXTENDED RELEASE ORAL
Qty: 30 TABLET | Refills: 11 | Status: SHIPPED | OUTPATIENT
Start: 2023-08-15

## 2023-08-24 ENCOUNTER — TELEPHONE (OUTPATIENT)
Dept: INFUSION THERAPY | Age: 63
End: 2023-08-24

## 2023-08-24 NOTE — PROGRESS NOTES
Progress Note      Pt Name: Jason Martinez  YOB: 1960  MRN: [id number]    Date of evaluation: 8/25/2023  History Obtained From:  Patient, EMR    HISTORY OF PRESENT ILLNESS:    Derrek Lan is a 42-year-old gentleman treated with biannual phlebotomies for symptomatic control of JAK2 negative secondary erythrocytosis. Colletta Cora feels symptomatic improvement in generalized body aches, feet achiness and generalized itching with phlebotomies. He was last phlebotomized 2/24/2023 for an H/H of 16.6/52. 2. He returns to the clinic today, H/H decreased at 14.6/43. 4. MCV 86.6. Colletta Cora states he feels okay. He continues to have generalized aches, no more than usual.  He does not feel he needs phlebotomy today. He denies melena or hematochezia. He states he is due for colonoscopy and has actually delayed colonoscopy due to visual difficulties, post cataract surgery in November, 2022. He has been following with ophthalmology and has since had further procedures. Vision is now improved. Blood pressure is stable. He continues Toprol and aspirin, managed by cardiology. HEMATOLOGIC HISTORY: Secondary erythrocytosis  Mr. Keller was seen in initial hematologic evaluation in this office on 09/03/13, referred by Dr. Rebecca Schofield for polycythemia. Mr. Keller was first diagnosed with polycythemia in November 2011 when he presented with a CBC on 10/15/13 with a hemoglobin of 18.9 and hematocrit of 56.8. Platelets and WBC were normal. His workup was performed by Dr. Karthikeyan Maza with a negative JAK2 mutation analysis and also negative for BCR/abl. Mr. Keller does have a 25 pack/year history of smoking however he quit in 2004. Management with periodic phlebotomies has been performed per Dr. Santiago Rather in conjunction with Dr. Lula Cline for the erythrocytosis of unclear etiology.  Mr. Keller seems to feel that his phlebotomies occasionally improved his symptoms of arthritis but this is yet to be determined if

## 2023-08-25 ENCOUNTER — OFFICE VISIT (OUTPATIENT)
Dept: HEMATOLOGY | Age: 63
End: 2023-08-25
Payer: COMMERCIAL

## 2023-08-25 ENCOUNTER — HOSPITAL ENCOUNTER (OUTPATIENT)
Dept: INFUSION THERAPY | Age: 63
Discharge: HOME | End: 2023-08-25
Payer: COMMERCIAL

## 2023-08-25 VITALS
HEIGHT: 70 IN | HEART RATE: 56 BPM | SYSTOLIC BLOOD PRESSURE: 114 MMHG | DIASTOLIC BLOOD PRESSURE: 68 MMHG | OXYGEN SATURATION: 96 % | WEIGHT: 165.1 LBS | BODY MASS INDEX: 23.64 KG/M2

## 2023-08-25 DIAGNOSIS — D75.1 SECONDARY ERYTHROCYTOSIS: ICD-10-CM

## 2023-08-25 DIAGNOSIS — L29.9 ITCHING: ICD-10-CM

## 2023-08-25 DIAGNOSIS — D75.1 SECONDARY ERYTHROCYTOSIS: Primary | ICD-10-CM

## 2023-08-25 DIAGNOSIS — Z71.89 CARE PLAN DISCUSSED WITH PATIENT: ICD-10-CM

## 2023-08-25 DIAGNOSIS — R53.82 CHRONIC FATIGUE: ICD-10-CM

## 2023-08-25 DIAGNOSIS — M25.50 MULTIPLE JOINT PAIN: ICD-10-CM

## 2023-08-25 LAB
ERYTHROCYTE [DISTWIDTH] IN BLOOD BY AUTOMATED COUNT: 13.6 % (ref 11.6–14.4)
HCT VFR BLD AUTO: 43.4 % (ref 40.1–51)
HGB BLD-MCNC: 14.6 G/DL (ref 13.7–17.5)
LYMPHOCYTES # BLD: 1.51 K/UL (ref 1.18–3.74)
LYMPHOCYTES NFR BLD: 19.5 % (ref 19.3–53.1)
MCH RBC QN AUTO: 29.1 PG (ref 25.7–32.2)
MCHC RBC AUTO-ENTMCNC: 33.6 G/DL (ref 32.3–36.5)
MCV RBC AUTO: 86.6 FL (ref 79–92.2)
MONOCYTES # BLD: 0.71 K/UL (ref 0.24–0.82)
MONOCYTES NFR BLD: 9.2 % (ref 4.7–12.5)
NEUTROPHILS # BLD: 4.98 K/UL (ref 1.56–6.13)
NEUTS SEG NFR BLD: 64.5 % (ref 34–71.1)
PLATELET # BLD AUTO: 184 K/UL (ref 163–337)
PMV BLD AUTO: 10.4 FL (ref 7.4–10.4)
RBC # BLD AUTO: 5.01 M/UL (ref 4.63–6.08)
WBC # BLD AUTO: 7.73 K/UL (ref 4.23–9.07)

## 2023-08-25 PROCEDURE — 99213 OFFICE O/P EST LOW 20 MIN: CPT | Performed by: NURSE PRACTITIONER

## 2023-08-25 PROCEDURE — 99211 OFF/OP EST MAY X REQ PHY/QHP: CPT

## 2023-08-25 PROCEDURE — 85025 COMPLETE CBC W/AUTO DIFF WBC: CPT

## 2023-08-25 PROCEDURE — 36415 COLL VENOUS BLD VENIPUNCTURE: CPT

## 2023-08-25 ASSESSMENT — ENCOUNTER SYMPTOMS
COUGH: 0
NAUSEA: 0
DIARRHEA: 0
SHORTNESS OF BREATH: 0
WHEEZING: 0
ABDOMINAL PAIN: 0
VOMITING: 0
CONSTIPATION: 0
SORE THROAT: 0
EYE ITCHING: 0
EYE DISCHARGE: 0
TROUBLE SWALLOWING: 0

## 2024-01-23 ENCOUNTER — OFFICE VISIT (OUTPATIENT)
Dept: CARDIOLOGY | Facility: CLINIC | Age: 64
End: 2024-01-23
Payer: COMMERCIAL

## 2024-01-23 VITALS
SYSTOLIC BLOOD PRESSURE: 138 MMHG | WEIGHT: 167 LBS | BODY MASS INDEX: 23.91 KG/M2 | HEART RATE: 54 BPM | OXYGEN SATURATION: 97 % | HEIGHT: 70 IN | DIASTOLIC BLOOD PRESSURE: 73 MMHG

## 2024-01-23 DIAGNOSIS — I47.10 PAROXYSMAL SVT (SUPRAVENTRICULAR TACHYCARDIA): Primary | ICD-10-CM

## 2024-01-23 DIAGNOSIS — Z82.49 FAMILY HISTORY OF EARLY CAD: ICD-10-CM

## 2024-01-23 DIAGNOSIS — I47.29 NONSUSTAINED VENTRICULAR TACHYCARDIA: ICD-10-CM

## 2024-01-23 DIAGNOSIS — I35.1 NONRHEUMATIC AORTIC VALVE INSUFFICIENCY: ICD-10-CM

## 2024-01-23 DIAGNOSIS — I49.3 PVC (PREMATURE VENTRICULAR CONTRACTION): ICD-10-CM

## 2024-01-23 DIAGNOSIS — E78.2 MIXED HYPERLIPIDEMIA: ICD-10-CM

## 2024-01-23 DIAGNOSIS — Z87.891 EX-SMOKER FOR MORE THAN 1 YEAR: ICD-10-CM

## 2024-01-23 DIAGNOSIS — I34.0 NONRHEUMATIC MITRAL VALVE REGURGITATION: ICD-10-CM

## 2024-01-23 PROCEDURE — 93000 ELECTROCARDIOGRAM COMPLETE: CPT | Performed by: NURSE PRACTITIONER

## 2024-01-23 PROCEDURE — 99214 OFFICE O/P EST MOD 30 MIN: CPT | Performed by: NURSE PRACTITIONER

## 2024-01-23 RX ORDER — SILDENAFIL 100 MG/1
100 TABLET, FILM COATED ORAL AS NEEDED
COMMUNITY

## 2024-01-23 RX ORDER — FLUTICASONE PROPIONATE 50 MCG
2 SPRAY, SUSPENSION (ML) NASAL AS NEEDED
COMMUNITY
Start: 2024-01-16

## 2024-01-23 RX ORDER — METOPROLOL SUCCINATE 25 MG/1
25 TABLET, EXTENDED RELEASE ORAL DAILY
Qty: 30 TABLET | Refills: 11 | Status: SHIPPED | OUTPATIENT
Start: 2024-01-23

## 2024-01-23 NOTE — PROGRESS NOTES
Subjective:     Encounter Date: 01/23/2024      Patient ID: Tyler Chin is a 63 y.o. male with strong family history of early coronary artery disease, and former smoker     Chief Complaint: routine follow up  History of Present Illness  Patient presents today for management of SVT. Today patient reports that he has been doing well. He denies any chest pain. He denies any dyspnea on exertion. He denies any heart racing or palpitations. He denies monitoring his blood pressure at home. He denies any leg swelling, othropnea or PND. He denies any dizziness or lightheadedness. He had a left knee surgery to repair a torn meniscus with Dr Hernandez.  Patient reports that he hasn't been monitoring it at home. Patient follows with Dr De Jesus as PCP.     The following portions of the patient's history were reviewed and updated as appropriate: allergies, current medications, past family history, past medical history, past social history, past surgical history and problem list.    Allergies   Allergen Reactions    Duloxetine GI Intolerance     Intolerance feels bad    Penicillins Rash       Current Outpatient Medications:     fluticasone (FLONASE) 50 MCG/ACT nasal spray, 2 sprays into the nostril(s) as directed by provider As Needed for Rhinitis or Allergies (SPRAY TWO SPRAYS into each nostril DAILY AS NEEDED)., Disp: , Rfl:     metoprolol succinate XL (TOPROL-XL) 25 MG 24 hr tablet, Take 1 tablet by mouth Daily., Disp: 30 tablet, Rfl: 11    pantoprazole (PROTONIX) 40 MG EC tablet, Take 1 tablet by mouth Every Morning., Disp: , Rfl:     sildenafil (VIAGRA) 100 MG tablet, Take 1 tablet by mouth As Needed for Erectile Dysfunction., Disp: , Rfl:     Past Medical History:   Diagnosis Date    Polycythemia      Social History     Socioeconomic History    Marital status:    Tobacco Use    Smoking status: Never     Passive exposure: Never    Smokeless tobacco: Never   Vaping Use    Vaping Use: Never used   Substance and Sexual  Activity    Alcohol use: Yes     Comment: occasional    Drug use: Never    Sexual activity: Defer       Review of Systems   Constitutional: Negative for malaise/fatigue.   HENT:  Negative for nosebleeds.    Cardiovascular:  Negative for chest pain, dyspnea on exertion, irregular heartbeat, leg swelling, near-syncope, orthopnea, palpitations, paroxysmal nocturnal dyspnea and syncope.   Respiratory:  Negative for shortness of breath.    Hematologic/Lymphatic: Does not bruise/bleed easily.   Genitourinary:  Negative for hematuria and urgency.   Neurological:  Negative for dizziness, headaches, light-headedness and weakness.   Psychiatric/Behavioral:  The patient is not nervous/anxious.    All other systems reviewed and are negative.         Objective:     Vitals reviewed.   Constitutional:       General: Not in acute distress.     Appearance: Normal appearance. Well-developed.   Eyes:      Pupils: Pupils are equal, round, and reactive to light.   HENT:      Head: Normocephalic and atraumatic.      Nose: Nose normal.   Neck:      Vascular: No carotid bruit.   Pulmonary:      Effort: Pulmonary effort is normal. No respiratory distress.      Breath sounds: Normal breath sounds. No wheezing. No rales.   Cardiovascular:      Normal rate. Regular rhythm.      Murmurs: There is no murmur.   Edema:     Peripheral edema absent.   Abdominal:      General: There is no distension.      Palpations: Abdomen is soft.   Musculoskeletal: Normal range of motion.      Cervical back: Normal range of motion and neck supple. Skin:     General: Skin is warm.      Findings: No erythema or rash.   Neurological:      General: No focal deficit present.      Mental Status: Alert and oriented to person, place, and time.   Psychiatric:         Attention and Perception: Attention normal.         Mood and Affect: Mood normal.         Speech: Speech normal.         Behavior: Behavior normal.         Thought Content: Thought content normal.          "Judgment: Judgment normal.         /73   Pulse 54   Ht 177.8 cm (70\")   Wt 75.8 kg (167 lb)   SpO2 97%   BMI 23.96 kg/m²       ECG 12 Lead    Date/Time: 1/23/2024 2:08 PM  Performed by: Willy Kaba APRN    Authorized by: Willy Kaba APRN  Comparison: compared with previous ECG from 6/1/2022  Similar to previous ECG  Rhythm: sinus bradycardia  Rate: bradycardic  BPM: 54          Lab Review:       Echo 6/12/2022: [facility]      Heart monitor 6/2022:      lexiscan 7/6/2022:  Interpretation Summary  Diaphragmatic attenuation artifact is present.  Left ventricular ejection fraction is moderately reduced. (Calculated EF = 38%).  Normal perfusion  Findings may be consistent with nonischemic cardiomyopathy    Lipid panel       I have personally reviewed heart monitor, Lexiscan, echo, labs and past office notes prior to patients visit  Assessment:          Diagnosis Plan   1. Paroxysmal SVT (supraventricular tachycardia)  metoprolol succinate XL (TOPROL-XL) 25 MG 24 hr tablet      2. Nonsustained ventricular tachycardia  metoprolol succinate XL (TOPROL-XL) 25 MG 24 hr tablet      3. Nonrheumatic mitral valve regurgitation        4. Nonrheumatic aortic valve insufficiency        5. PVC (premature ventricular contraction)        6. Mixed hyperlipidemia        7. Family history of early CAD        8. Ex-smoker for more than 1 year                 Plan:       1. Paroxysmal SVT: On holter monitor from 6/2022. Was started on metoprolol. Patient denies any palpitations or heart racing.     2. Nonsustained ventricular tachycardia: noted on holter monitor 6/2022. Low risk nuclear stress test done 7/6/2022.     3. Mitral valve regurgitation: mild on echo 6/6/2022. Will continue to monitor routinely and plan recheck at follow up     4. Aortic valve regurgitation: mild of echo 6/6/2022. Will continue to monitor routinely and plan to recheck at follow up     5. PVC: noted on EKG previously. 2.5% burden on holter " monitor 6/2022.     6. Hyperlipidemia: managed and followed by PCP    7. Family history of early CAD    8. Former smoker: quit smoking >1 year ago.     I attest that all portions of this note reviewed and all information has been updated by myself to reflect the patient's current status.      I spent 36 minutes caring for Tyler on this date of service. This time includes time spent by me in the following activities:preparing for the visit, reviewing tests, obtaining and/or reviewing a separately obtained history, performing a medically appropriate examination and/or evaluation , counseling and educating the patient/family/caregiver, ordering medications, tests, or procedures, and documenting information in the medical record    I spent 2 minutes on the separately reported service of EKG. This time is not included in the time used to support the E/M service also reported today.    Patient is to follow up in 1 year or sooner if needed

## 2024-02-21 ENCOUNTER — TELEPHONE (OUTPATIENT)
Dept: HEMATOLOGY | Age: 64
End: 2024-02-21

## 2024-02-21 NOTE — TELEPHONE ENCOUNTER
Called patient and reminded patient of their appointment on 02/23/2024and patient confirmed they would be here.

## 2024-02-22 NOTE — PROGRESS NOTES
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  He is treated with biannual phlebotomies as needed for symptomatic control including generalized body aches, feet achiness, generalized itching, all which she feels improves with phlebotomies.  Rakan was last phlebotomized 2/2023 for an H/H of 16.6/52.2.   He has been monitored since that time, symptomatically \"feeling about the same\".  Achiness is no worsening he states..  Rakan asks to be tested for alpha gal today.  He states for the past 4-5 years he has had intermittent difficulty after eating red meat.  For example, he states 3-4 hours after eating ribs he has difficulty breathing, nausea that last for approximately 2 hours.  Symptoms occur only sometimes, not every time after eating red meat.  He also reports rash at times and joint aches.    HEMATOLOGIC HISTORY: Secondary erythrocytosis  Mr. Bowen was seen in initial hematologic evaluation in this office on 09/03/13, referred by Dr. Miguel Angel Mcqueen for polycythemia.  Mr. Bowen was first diagnosed with polycythemia in November 2011 when he presented with a CBC on 10/15/13 with a hemoglobin of 18.9 and hematocrit of 56.8. Platelets and WBC were normal. His workup was performed by Dr. Dany Chaidez with a negative JAK2 mutation analysis and also negative for BCR/abl.   Mr. Bowen does have a 22 pack/year history of smoking however he quit in 2004. Management with periodic phlebotomies has been performed per Dr. Chaidez in conjunction with Dr. Mcqueen for the erythrocytosis of unclear etiology. Mr. Bowen seems to feel that his phlebotomies occasionally improved his symptoms of arthritis but this is yet to be

## 2024-02-23 ENCOUNTER — HOSPITAL ENCOUNTER (OUTPATIENT)
Dept: INFUSION THERAPY | Age: 64
Discharge: HOME | End: 2024-02-23
Payer: COMMERCIAL

## 2024-02-23 ENCOUNTER — OFFICE VISIT (OUTPATIENT)
Dept: HEMATOLOGY | Age: 64
End: 2024-02-23
Payer: COMMERCIAL

## 2024-02-23 VITALS
TEMPERATURE: 98 F | HEIGHT: 70 IN | SYSTOLIC BLOOD PRESSURE: 116 MMHG | OXYGEN SATURATION: 97 % | DIASTOLIC BLOOD PRESSURE: 72 MMHG | WEIGHT: 168 LBS | HEART RATE: 58 BPM | BODY MASS INDEX: 24.05 KG/M2

## 2024-02-23 DIAGNOSIS — R53.82 CHRONIC FATIGUE: ICD-10-CM

## 2024-02-23 DIAGNOSIS — D75.1 SECONDARY ERYTHROCYTOSIS: Primary | ICD-10-CM

## 2024-02-23 DIAGNOSIS — T78.1XXA DELAYED ALLERGIC REACTION TO RED MEAT: ICD-10-CM

## 2024-02-23 DIAGNOSIS — D75.1 SECONDARY ERYTHROCYTOSIS: ICD-10-CM

## 2024-02-23 DIAGNOSIS — M25.50 MULTIPLE JOINT PAIN: ICD-10-CM

## 2024-02-23 DIAGNOSIS — Z71.89 CARE PLAN DISCUSSED WITH PATIENT: ICD-10-CM

## 2024-02-23 LAB
BASOPHILS # BLD: 0.07 K/UL (ref 0.01–0.08)
BASOPHILS NFR BLD: 1.1 % (ref 0.1–1.2)
EOSINOPHIL # BLD: 0.34 K/UL (ref 0.04–0.54)
EOSINOPHIL NFR BLD: 5.2 % (ref 0.7–7)
ERYTHROCYTE [DISTWIDTH] IN BLOOD BY AUTOMATED COUNT: 12.8 % (ref 11.6–14.4)
HCT VFR BLD AUTO: 45.5 % (ref 40.1–51)
HGB BLD-MCNC: 14.9 G/DL (ref 13.7–17.5)
LYMPHOCYTES # BLD: 1.33 K/UL (ref 1.18–3.74)
LYMPHOCYTES NFR BLD: 20.5 % (ref 19.3–53.1)
MCH RBC QN AUTO: 29.4 PG (ref 25.7–32.2)
MCHC RBC AUTO-ENTMCNC: 32.7 G/DL (ref 32.3–36.5)
MCV RBC AUTO: 89.9 FL (ref 79–92.2)
MONOCYTES # BLD: 0.56 K/UL (ref 0.24–0.82)
MONOCYTES NFR BLD: 8.6 % (ref 4.7–12.5)
NEUTROPHILS # BLD: 4.17 K/UL (ref 1.56–6.13)
NEUTS SEG NFR BLD: 64.4 % (ref 34–71.1)
PLATELET # BLD AUTO: 250 K/UL (ref 163–337)
PMV BLD AUTO: 9.5 FL (ref 7.4–10.4)
RBC # BLD AUTO: 5.06 M/UL (ref 4.63–6.08)
WBC # BLD AUTO: 6.48 K/UL (ref 4.23–9.07)

## 2024-02-23 PROCEDURE — 99212 OFFICE O/P EST SF 10 MIN: CPT

## 2024-02-23 PROCEDURE — 99214 OFFICE O/P EST MOD 30 MIN: CPT | Performed by: NURSE PRACTITIONER

## 2024-02-23 PROCEDURE — 36415 COLL VENOUS BLD VENIPUNCTURE: CPT

## 2024-02-23 PROCEDURE — 85025 COMPLETE CBC W/AUTO DIFF WBC: CPT

## 2024-02-23 ASSESSMENT — ENCOUNTER SYMPTOMS
WHEEZING: 0
SORE THROAT: 0
COUGH: 0
SHORTNESS OF BREATH: 1
TROUBLE SWALLOWING: 0
DIARRHEA: 0
EYE ITCHING: 0
VOMITING: 0
CONSTIPATION: 0
NAUSEA: 1
ABDOMINAL PAIN: 0
EYE DISCHARGE: 0

## 2024-02-25 LAB
ALLERGEN,FOOD, ALPHA-GAL IGE: 0.31 KU/L
BEEF IGE QN: <0.1 KU/L
DEPRECATED MISC ALLERGEN IGE RAST QL: NORMAL
LAMB IGE QN: <0.1 KU/L
PORK IGE QN: <0.1 KU/L

## 2024-08-02 DIAGNOSIS — I47.10 PAROXYSMAL SVT (SUPRAVENTRICULAR TACHYCARDIA): ICD-10-CM

## 2024-08-02 DIAGNOSIS — I47.29 NONSUSTAINED VENTRICULAR TACHYCARDIA: ICD-10-CM

## 2024-08-02 RX ORDER — METOPROLOL SUCCINATE 25 MG/1
25 TABLET, EXTENDED RELEASE ORAL DAILY
OUTPATIENT
Start: 2024-08-02

## 2024-08-08 DIAGNOSIS — I47.29 NONSUSTAINED VENTRICULAR TACHYCARDIA: ICD-10-CM

## 2024-08-08 DIAGNOSIS — I47.10 PAROXYSMAL SVT (SUPRAVENTRICULAR TACHYCARDIA): ICD-10-CM

## 2024-08-08 NOTE — TELEPHONE ENCOUNTER
Caller: Tyler Chin    Relationship: Self    Best call back number: [phone]     Requested Prescriptions:   Requested Prescriptions     Pending Prescriptions Disp Refills    metoprolol succinate XL (TOPROL-XL) 25 MG 24 hr tablet 30 tablet 11     Sig: Take 1 tablet by mouth Daily.        Pharmacy where request should be sent: [address] [facility] [phone] [facility] [phone]      Last office visit with prescribing clinician: 1/23/2024   Last telemedicine visit with prescribing clinician: Visit date not found   Next office visit with prescribing clinician: 1/28/2025     Additional details provided by patient: PT HAS 7 DAYS LEFT; PT IS REQUESTS THAT HE GETS A 60 OR 90 DAY PRESCRIPTION TO MAKE THINGS EASIER    Does the patient have less than a 3 day supply:  [] Yes  [x] No    Would you like a call back once the refill request has been completed: [x] Yes [] No    If the office needs to give you a call back, can they leave a voicemail: [x] Yes [] No    Jos Bethea Rep   08/08/24 09:35 CDT

## 2024-08-09 RX ORDER — METOPROLOL SUCCINATE 25 MG/1
25 TABLET, EXTENDED RELEASE ORAL DAILY
Qty: 90 TABLET | Refills: 3 | Status: SHIPPED | OUTPATIENT
Start: 2024-08-09

## 2024-08-22 ENCOUNTER — TELEPHONE (OUTPATIENT)
Dept: HEMATOLOGY | Age: 64
End: 2024-08-22

## 2024-08-22 NOTE — TELEPHONE ENCOUNTER
Called patient today for their upcoming appointment on 08/23/2024 and patient needed to be rescheduled. Patient's name and number was taken and given to  staff ( sent to  via teams    )  so that the patient could be rescheduled to a better date & time for the patient. We have now moved to the [facility] that is located between our old office and the ER at the [facility]

## 2024-08-22 NOTE — PROGRESS NOTES
arthritis but this is yet to be determined if there is a direct correlation between these procedures and his rheumatologic symptoms.  Mr. Bowen's hemoglobin at his initial consultation in this office was 11.5 with an MCV of 84.2. The other parameters of his CBC were normal.  A bone marrow biopsy and aspirate was performed on 9/18/14. This was normocellular with trilineage hematopoesis and only mild megakaryocytic hyperplasia. There was NO evidence of JAK2 V617F or Exon 12 mutation. Stainable iron was absent cytogenetics were normal it was felt that his erythrocytosis was secondary.  An erythropoietin level was normal at 6.5.  A sleep apnea study was done with Dr. William Mendes on 10/21/2014 revealing mild obstructive sleep apnea and insomnia. Recommendations were to avoid alcohol prior to sleep (Mr. Bowen does drink beer regularly, but states he is not concerned about this because it is [employer] light).  Continued periodic phlebotomies will be done.   CALR and MPL W515 mutations were negative.    Past Medical History:   Diagnosis Date    Allergy to alpha-gal     Cataract (lens) fragments in eye following cataract surgery, bilateral     Colon polyposis     COPD (chronic obstructive pulmonary disease) (HCC)     Mild    Diverticulosis     Erythrocytosis     Former smoker     Quit 2004    GERD (gastroesophageal reflux disease)     With Schatzki's ring    Hyperlipidemia     Mixed hyperlipidemia     Nonrheumatic aortic (valve) insufficiency     Nonrheumatic mitral valve regurgitation     PVC (premature ventricular contraction)      Past Surgical History:   Procedure Laterality Date    CATARACT EXTRACTION, BILATERAL      COLONOSCOPY  04/19/2017    In [place], [place] at [facility]    KNEE SURGERY  2023    meniscus repair and fatty tissue removed    UPPER GASTROINTESTINAL ENDOSCOPY  03/12/2013    In [place], [place]     Current Outpatient Medications   Medication Sig Dispense Refill    acetaminophen (TYLENOL) 500 MG tablet

## 2024-10-11 ENCOUNTER — TELEPHONE (OUTPATIENT)
Dept: HEMATOLOGY | Age: 64
End: 2024-10-11

## 2024-10-11 NOTE — TELEPHONE ENCOUNTER
I called patient and reminded patient of their appt on 10/14/24 and patient confirmed they would be here. I also let patient know that we have moved into our new cancer facility and asked patient if they were aware of where we were now located, and patient voiced understanding of our new location. Patient knows not to arrive early and that we will get labs at the time of the follow up appointment and not the lab appointment time.

## 2024-11-19 ENCOUNTER — TELEPHONE (OUTPATIENT)
Dept: HEMATOLOGY | Age: 64
End: 2024-11-19

## 2024-11-19 NOTE — TELEPHONE ENCOUNTER
Called Patient and reminded patient of their appointment on 11/21/2024 and patient confirmed they would be here. Reminded patient to just come at appointment time, and to not come at the lab appointment time. Reminded patient that we will not check them in any more than 30 minutes before appointment time.  We have now moved to the [facility] that is located between our old office and the ER at the [facility]. Letting the Pt know that our front entrance faces the  [email or web address] ball fields. Reminded pt to eat well and be well hydrated for their labs.

## 2024-11-21 ENCOUNTER — OFFICE VISIT (OUTPATIENT)
Dept: HEMATOLOGY | Age: 64
End: 2024-11-21
Payer: COMMERCIAL

## 2024-11-21 ENCOUNTER — HOSPITAL ENCOUNTER (OUTPATIENT)
Dept: INFUSION THERAPY | Age: 64
Discharge: HOME | End: 2024-11-21
Payer: COMMERCIAL

## 2024-11-21 VITALS
OXYGEN SATURATION: 98 % | DIASTOLIC BLOOD PRESSURE: 72 MMHG | HEIGHT: 70 IN | WEIGHT: 166 LBS | HEART RATE: 68 BPM | BODY MASS INDEX: 23.77 KG/M2 | SYSTOLIC BLOOD PRESSURE: 118 MMHG | TEMPERATURE: 97.7 F

## 2024-11-21 DIAGNOSIS — Z71.89 CARE PLAN DISCUSSED WITH PATIENT: ICD-10-CM

## 2024-11-21 DIAGNOSIS — D75.1 SECONDARY ERYTHROCYTOSIS: Primary | ICD-10-CM

## 2024-11-21 DIAGNOSIS — D75.1 SECONDARY ERYTHROCYTOSIS: ICD-10-CM

## 2024-11-21 LAB
BASOPHILS # BLD: 0.06 K/UL (ref 0.01–0.08)
BASOPHILS NFR BLD: 0.9 % (ref 0.1–1.2)
EOSINOPHIL # BLD: 0.27 K/UL (ref 0.04–0.54)
EOSINOPHIL NFR BLD: 4.1 % (ref 0.7–7)
ERYTHROCYTE [DISTWIDTH] IN BLOOD BY AUTOMATED COUNT: 12.9 % (ref 11.6–14.4)
HCT VFR BLD AUTO: 44.7 % (ref 40.1–51)
HGB BLD-MCNC: 15.1 G/DL (ref 13.7–17.5)
LYMPHOCYTES # BLD: 1.5 K/UL (ref 1.18–3.74)
LYMPHOCYTES NFR BLD: 22.9 % (ref 19.3–53.1)
MCH RBC QN AUTO: 30.4 PG (ref 25.7–32.2)
MCHC RBC AUTO-ENTMCNC: 33.8 G/DL (ref 32.3–36.5)
MCV RBC AUTO: 89.9 FL (ref 79–92.2)
MONOCYTES # BLD: 0.54 K/UL (ref 0.24–0.82)
MONOCYTES NFR BLD: 8.2 % (ref 4.7–12.5)
NEUTROPHILS # BLD: 4.16 K/UL (ref 1.56–6.13)
NEUTS SEG NFR BLD: 63.6 % (ref 34–71.1)
PLATELET # BLD AUTO: 247 K/UL (ref 163–337)
PMV BLD AUTO: 9.6 FL (ref 7.4–10.4)
RBC # BLD AUTO: 4.97 M/UL (ref 4.63–6.08)
WBC # BLD AUTO: 6.55 K/UL (ref 4.23–9.07)

## 2024-11-21 PROCEDURE — 99211 OFF/OP EST MAY X REQ PHY/QHP: CPT

## 2024-11-21 PROCEDURE — 36415 COLL VENOUS BLD VENIPUNCTURE: CPT

## 2024-11-21 PROCEDURE — 85025 COMPLETE CBC W/AUTO DIFF WBC: CPT

## 2024-11-21 PROCEDURE — 99213 OFFICE O/P EST LOW 20 MIN: CPT | Performed by: NURSE PRACTITIONER

## 2024-11-21 RX ORDER — ACETAMINOPHEN 500 MG
1000 TABLET ORAL EVERY 4 HOURS PRN
COMMUNITY

## 2024-11-21 RX ORDER — DICYCLOMINE HCL 20 MG
20 TABLET ORAL 2 TIMES DAILY
COMMUNITY
Start: 2024-10-14

## 2024-11-21 RX ORDER — SILDENAFIL 100 MG/1
100 TABLET, FILM COATED ORAL DAILY PRN
COMMUNITY
Start: 2024-07-12

## 2024-11-21 RX ORDER — ROPINIROLE 0.5 MG/1
0.5 TABLET, FILM COATED ORAL PRN
COMMUNITY

## 2024-11-21 ASSESSMENT — ENCOUNTER SYMPTOMS
COUGH: 0
EYE ITCHING: 0
WHEEZING: 0
EYE DISCHARGE: 0
VOMITING: 0
NAUSEA: 1
DIARRHEA: 0
SHORTNESS OF BREATH: 1
CONSTIPATION: 0
ABDOMINAL PAIN: 0
SORE THROAT: 0
TROUBLE SWALLOWING: 0

## 2025-01-28 ENCOUNTER — OFFICE VISIT (OUTPATIENT)
Dept: CARDIOLOGY | Facility: CLINIC | Age: 65
End: 2025-01-28
Payer: COMMERCIAL

## 2025-01-28 VITALS
OXYGEN SATURATION: 96 % | HEART RATE: 67 BPM | HEIGHT: 70 IN | SYSTOLIC BLOOD PRESSURE: 122 MMHG | DIASTOLIC BLOOD PRESSURE: 80 MMHG | BODY MASS INDEX: 23.62 KG/M2 | WEIGHT: 165 LBS

## 2025-01-28 DIAGNOSIS — I34.0 NONRHEUMATIC MITRAL VALVE REGURGITATION: ICD-10-CM

## 2025-01-28 DIAGNOSIS — E78.2 MIXED HYPERLIPIDEMIA: ICD-10-CM

## 2025-01-28 DIAGNOSIS — I47.10 PAROXYSMAL SVT (SUPRAVENTRICULAR TACHYCARDIA): Primary | ICD-10-CM

## 2025-01-28 DIAGNOSIS — Z87.891 EX-SMOKER FOR MORE THAN 1 YEAR: ICD-10-CM

## 2025-01-28 DIAGNOSIS — I47.29 NONSUSTAINED VENTRICULAR TACHYCARDIA: ICD-10-CM

## 2025-01-28 DIAGNOSIS — I35.1 NONRHEUMATIC AORTIC VALVE INSUFFICIENCY: ICD-10-CM

## 2025-01-28 DIAGNOSIS — Z82.49 FAMILY HISTORY OF EARLY CAD: ICD-10-CM

## 2025-01-28 DIAGNOSIS — I49.3 PVC (PREMATURE VENTRICULAR CONTRACTION): ICD-10-CM

## 2025-01-28 PROCEDURE — 99214 OFFICE O/P EST MOD 30 MIN: CPT | Performed by: NURSE PRACTITIONER

## 2025-01-28 PROCEDURE — 93000 ELECTROCARDIOGRAM COMPLETE: CPT | Performed by: NURSE PRACTITIONER

## 2025-01-28 RX ORDER — ACETAMINOPHEN 500 MG
1000 TABLET ORAL
COMMUNITY

## 2025-01-28 RX ORDER — METOPROLOL SUCCINATE 25 MG/1
25 TABLET, EXTENDED RELEASE ORAL DAILY
Qty: 90 TABLET | Refills: 3 | Status: SHIPPED | OUTPATIENT
Start: 2025-01-28

## 2025-01-28 RX ORDER — CALCIUM CARBONATE 500 MG/1
2 TABLET, CHEWABLE ORAL DAILY PRN
COMMUNITY

## 2025-01-28 RX ORDER — ALPRAZOLAM 0.25 MG/1
.25-.5 TABLET ORAL
COMMUNITY
Start: 2024-08-30

## 2025-01-28 RX ORDER — MELOXICAM 15 MG/1
15 TABLET ORAL
COMMUNITY

## 2025-01-28 NOTE — PROGRESS NOTES
Subjective:     Encounter Date: 01/28/2025      Patient ID: Tyler Chin is a 64 y.o. male with strong family history of early coronary artery disease, and former smoker     Chief Complaint:  no complaints  History of Present Illness  Patient presents today for management of SVT. Today patient reports that he has been doing well. He denies any chest pain. He denies any dyspnea on exertion. He reports that he had some heart racing that was brief. He denies monitoring his blood pressure at home but reports it has remained controlled at other offices. He denies any leg swelling, othropnea or PND. He denies any dizziness or lightheadedness. Patient follows with Dr De Jesus as PCP.     The following portions of the patient's history were reviewed and updated as appropriate: allergies, current medications, past family history, past medical history, past social history, past surgical history and problem list.    Allergies   Allergen Reactions    Duloxetine GI Intolerance     Intolerance feels bad    Penicillins Rash       Current Outpatient Medications:     ALPRAZolam (XANAX) 0.25 MG tablet, Take 1-2 tablets by mouth., Disp: , Rfl:     metoprolol succinate XL (TOPROL-XL) 25 MG 24 hr tablet, Take 1 tablet by mouth Daily., Disp: 90 tablet, Rfl: 3    pantoprazole (PROTONIX) 40 MG EC tablet, Take 1 tablet by mouth Every Morning., Disp: , Rfl:     acetaminophen (TYLENOL) 500 MG tablet, Take 2 tablets by mouth., Disp: , Rfl:     calcium carbonate (TUMS) 500 MG chewable tablet, Chew 2 tablets Daily As Needed., Disp: , Rfl:     meloxicam (MOBIC) 15 MG tablet, Take 1 tablet by mouth., Disp: , Rfl:     sildenafil (VIAGRA) 100 MG tablet, Take 1 tablet by mouth As Needed for Erectile Dysfunction., Disp: , Rfl:     Past Medical History:   Diagnosis Date    Polycythemia      Social History     Socioeconomic History    Marital status:    Tobacco Use    Smoking status: Never     Passive exposure: Never    Smokeless tobacco: Never    Vaping Use    Vaping status: Never Used   Substance and Sexual Activity    Alcohol use: Yes     Comment: occasional    Drug use: Never    Sexual activity: Defer       Review of Systems   Constitutional: Negative for malaise/fatigue.   HENT:  Negative for nosebleeds.    Cardiovascular:  Negative for chest pain, dyspnea on exertion, irregular heartbeat, leg swelling, near-syncope, orthopnea, palpitations, paroxysmal nocturnal dyspnea and syncope.   Respiratory:  Negative for shortness of breath.    Hematologic/Lymphatic: Does not bruise/bleed easily.   Genitourinary:  Negative for hematuria and urgency.   Neurological:  Negative for dizziness, headaches, light-headedness and weakness.   Psychiatric/Behavioral:  The patient is not nervous/anxious.    All other systems reviewed and are negative.         Objective:     Vitals reviewed.   Constitutional:       General: Not in acute distress.     Appearance: Normal appearance. Well-developed.   Eyes:      Pupils: Pupils are equal, round, and reactive to light.   HENT:      Head: Normocephalic and atraumatic.      Nose: Nose normal.   Neck:      Vascular: No carotid bruit.   Pulmonary:      Effort: Pulmonary effort is normal. No respiratory distress.      Breath sounds: Normal breath sounds. No wheezing. No rales.   Cardiovascular:      Normal rate. Regular rhythm.      Murmurs: There is no murmur.   Edema:     Peripheral edema absent.   Abdominal:      General: There is no distension.      Palpations: Abdomen is soft.   Musculoskeletal: Normal range of motion.      Cervical back: Normal range of motion and neck supple. Skin:     General: Skin is warm.      Findings: No erythema or rash.   Neurological:      General: No focal deficit present.      Mental Status: Alert and oriented to person, place, and time.   Psychiatric:         Attention and Perception: Attention normal.         Mood and Affect: Mood normal.         Speech: Speech normal.         Behavior: Behavior  "normal.         Thought Content: Thought content normal.         Judgment: Judgment normal.         /80   Pulse 67   Ht 177.8 cm (70\")   Wt 74.8 kg (165 lb)   SpO2 96%   BMI 23.68 kg/m²       ECG 12 Lead    Date/Time: 1/28/2025 2:45 PM  Performed by: Willy Kaba APRN    Authorized by: Willy Kaba APRN  Comparison: compared with previous ECG from 1/23/2024  Similar to previous ECG  Rhythm: sinus bradycardia  Rate: bradycardic  BPM: 57          Lab Review:       Echo 6/12/2022: [facility]      Heart monitor 6/2022:      lexiscan 7/6/2022:  Interpretation Summary  Diaphragmatic attenuation artifact is present.  Left ventricular ejection fraction is moderately reduced. (Calculated EF = 38%).  Normal perfusion  Findings may be consistent with nonischemic cardiomyopathy    Lipid panel       I have personally reviewed heart monitor, Lexiscan, echo, labs and past office notes prior to patients visit  Assessment:          Diagnosis Plan   1. Paroxysmal SVT (supraventricular tachycardia)  metoprolol succinate XL (TOPROL-XL) 25 MG 24 hr tablet      2. Nonsustained ventricular tachycardia  metoprolol succinate XL (TOPROL-XL) 25 MG 24 hr tablet      3. Nonrheumatic mitral valve regurgitation  Adult Transthoracic Echo Complete w/ Color, Spectral and Contrast if necessary per protocol      4. Nonrheumatic aortic valve insufficiency  Adult Transthoracic Echo Complete w/ Color, Spectral and Contrast if necessary per protocol      5. PVC (premature ventricular contraction)        6. Mixed hyperlipidemia        7. Family history of early CAD        8. Ex-smoker for more than 1 year                   Plan:       1. Paroxysmal SVT: On holter monitor from 6/2022. Stable. Continue metoprolol     2. Nonsustained ventricular tachycardia: noted on holter monitor 6/2022. Low risk nuclear stress test done 7/6/2022.     3. Mitral valve regurgitation: mild on echo 6/6/2022. Will repeat echo prior to follow up     4. Aortic " valve regurgitation: mild of echo 6/6/2022. Will repeat echo prior to follow up     5. PVC: noted on EKG previously. 2.5% burden on holter monitor 6/2022.     6. Hyperlipidemia: managed and followed by PCP    7. Family history of early CAD    8. Former smoker: quit smoking >1 year ago.     I attest that all portions of this note reviewed and all information has been updated by myself to reflect the patient's current status.      I spent 35 minutes caring for Tyler on this date of service. This time includes time spent by me in the following activities:preparing for the visit, reviewing tests, obtaining and/or reviewing a separately obtained history, performing a medically appropriate examination and/or evaluation , counseling and educating the patient/family/caregiver, ordering medications, tests, or procedures, and documenting information in the medical record    I spent 2 minutes on the separately reported service of EKG. This time is not included in the time used to support the E/M service also reported today.    Patient is to follow up in 1 year or sooner if needed

## 2025-06-11 ENCOUNTER — HOSPITAL ENCOUNTER (OUTPATIENT)
Dept: CARDIOLOGY | Facility: HOSPITAL | Age: 65
Discharge: HOME | End: 2025-06-11
Admitting: NURSE PRACTITIONER
Payer: COMMERCIAL

## 2025-06-11 VITALS
DIASTOLIC BLOOD PRESSURE: 63 MMHG | WEIGHT: 165.34 LBS | HEIGHT: 70 IN | SYSTOLIC BLOOD PRESSURE: 122 MMHG | BODY MASS INDEX: 23.67 KG/M2 | HEART RATE: 60 BPM

## 2025-06-11 DIAGNOSIS — I35.1 NONRHEUMATIC AORTIC VALVE INSUFFICIENCY: ICD-10-CM

## 2025-06-11 DIAGNOSIS — I34.0 NONRHEUMATIC MITRAL VALVE REGURGITATION: ICD-10-CM

## 2025-06-11 LAB
AORTIC DIMENSIONLESS INDEX: 0.72 (DI)
ASCENDING AORTA: 3.2 CM
AV MEAN PRESS GRAD SYS DOP V1V2: 3.7 MMHG
AV VMAX SYS DOP: 146.8 CM/SEC
BH CV ECHO LEFT VENTRICLE GLOBAL LONGITUDINAL STRAIN: -20.1 %
BH CV ECHO MEAS - AO MAX PG: 8.7 MMHG
BH CV ECHO MEAS - AO ROOT DIAM: 3.7 CM
BH CV ECHO MEAS - AO V2 VTI: 30 CM
BH CV ECHO MEAS - AVA(I,D): 3.3 CM2
BH CV ECHO MEAS - EDV(CUBED): 92.9 ML
BH CV ECHO MEAS - ESV(CUBED): 31.4 ML
BH CV ECHO MEAS - FS: 30.4 %
BH CV ECHO MEAS - IVS/LVPW: 1.02 CM
BH CV ECHO MEAS - IVSD: 1.12 CM
BH CV ECHO MEAS - LAT PEAK E' VEL: 8.3 CM/SEC
BH CV ECHO MEAS - LV MASS(C)D: 179.2 GRAMS
BH CV ECHO MEAS - LV MAX PG: 3.9 MMHG
BH CV ECHO MEAS - LV MEAN PG: 1.67 MMHG
BH CV ECHO MEAS - LV V1 MAX: 98.1 CM/SEC
BH CV ECHO MEAS - LV V1 VTI: 21.5 CM
BH CV ECHO MEAS - LVIDD: 4.5 CM
BH CV ECHO MEAS - LVIDS: 3.2 CM
BH CV ECHO MEAS - LVOT AREA: 4.7 CM2
BH CV ECHO MEAS - LVOT DIAM: 2.43 CM
BH CV ECHO MEAS - LVPWD: 1.1 CM
BH CV ECHO MEAS - MED PEAK E' VEL: 6.8 CM/SEC
BH CV ECHO MEAS - MV A MAX VEL: 58.1 CM/SEC
BH CV ECHO MEAS - MV DEC SLOPE: 399.7 CM/SEC2
BH CV ECHO MEAS - MV DEC TIME: 0.16 SEC
BH CV ECHO MEAS - MV E MAX VEL: 62.2 CM/SEC
BH CV ECHO MEAS - MV E/A: 1.07
BH CV ECHO MEAS - PA V2 MAX: 82.1 CM/SEC
BH CV ECHO MEAS - RAP SYSTOLE: 3 MMHG
BH CV ECHO MEAS - RV MAX PG: 1.21 MMHG
BH CV ECHO MEAS - RV V1 MAX: 55 CM/SEC
BH CV ECHO MEAS - RV V1 VTI: 12.6 CM
BH CV ECHO MEAS - RVDD: 4 CM
BH CV ECHO MEAS - RVSP: 25.7 MMHG
BH CV ECHO MEAS - SV(LVOT): 100 ML
BH CV ECHO MEAS - SVI(LVOT): 51.9 ML/M2
BH CV ECHO MEAS - TAPSE (>1.6): 2.08 CM
BH CV ECHO MEAS - TR MAX PG: 22.7 MMHG
BH CV ECHO MEAS - TR MAX VEL: 238.2 CM/SEC
BH CV ECHO MEASUREMENTS AVERAGE E/E' RATIO: 8.24
BH CV XLRA - RV BASE: 3.7 CM
BH CV XLRA - RV LENGTH: 7.2 CM
BH CV XLRA - RV MID: 2.31 CM
BH CV XLRA - TDI S': 22 CM/SEC
LEFT ATRIUM VOLUME INDEX: 33.4 ML/M2
LEFT ATRIUM VOLUME: 64 ML
SINUS: 3.7 CM
STJ: 2.4 CM

## 2025-06-11 PROCEDURE — 93306 TTE W/DOPPLER COMPLETE: CPT

## 2025-06-11 PROCEDURE — 93356 MYOCRD STRAIN IMG SPCKL TRCK: CPT
